# Patient Record
Sex: MALE | Race: WHITE | NOT HISPANIC OR LATINO | Employment: OTHER | URBAN - NONMETROPOLITAN AREA
[De-identification: names, ages, dates, MRNs, and addresses within clinical notes are randomized per-mention and may not be internally consistent; named-entity substitution may affect disease eponyms.]

---

## 2017-02-08 DIAGNOSIS — I48.0 PAF (PAROXYSMAL ATRIAL FIBRILLATION) (HCC): ICD-10-CM

## 2017-02-08 DIAGNOSIS — Z79.01 CHRONIC ANTICOAGULATION: ICD-10-CM

## 2017-04-11 ENCOUNTER — OFFICE VISIT (OUTPATIENT)
Dept: CARDIOLOGY | Facility: CLINIC | Age: 77
End: 2017-04-11
Payer: MEDICARE

## 2017-04-11 VITALS
OXYGEN SATURATION: 92 % | HEIGHT: 66 IN | BODY MASS INDEX: 35.68 KG/M2 | WEIGHT: 222 LBS | DIASTOLIC BLOOD PRESSURE: 80 MMHG | HEART RATE: 61 BPM | SYSTOLIC BLOOD PRESSURE: 160 MMHG

## 2017-04-11 DIAGNOSIS — E66.9 OBESITY (BMI 35.0-39.9 WITHOUT COMORBIDITY): ICD-10-CM

## 2017-04-11 DIAGNOSIS — Z79.01 CHRONIC ANTICOAGULATION: ICD-10-CM

## 2017-04-11 DIAGNOSIS — G47.33 OSA (OBSTRUCTIVE SLEEP APNEA): ICD-10-CM

## 2017-04-11 DIAGNOSIS — I48.0 PAF (PAROXYSMAL ATRIAL FIBRILLATION) (HCC): ICD-10-CM

## 2017-04-11 DIAGNOSIS — E78.1 HYPERTRIGLYCERIDEMIA: ICD-10-CM

## 2017-04-11 DIAGNOSIS — Z78.9 STATIN INTOLERANCE: ICD-10-CM

## 2017-04-11 PROCEDURE — 1036F TOBACCO NON-USER: CPT | Performed by: INTERNAL MEDICINE

## 2017-04-11 PROCEDURE — 3288F FALL RISK ASSESSMENT DOCD: CPT | Mod: 8P | Performed by: INTERNAL MEDICINE

## 2017-04-11 PROCEDURE — G8432 DEP SCR NOT DOC, RNG: HCPCS | Performed by: INTERNAL MEDICINE

## 2017-04-11 PROCEDURE — 99214 OFFICE O/P EST MOD 30 MIN: CPT | Performed by: INTERNAL MEDICINE

## 2017-04-11 PROCEDURE — G8417 CALC BMI ABV UP PARAM F/U: HCPCS | Performed by: INTERNAL MEDICINE

## 2017-04-11 PROCEDURE — 4040F PNEUMOC VAC/ADMIN/RCVD: CPT | Performed by: INTERNAL MEDICINE

## 2017-04-11 PROCEDURE — 3017F COLORECTAL CA SCREEN DOC REV: CPT | Mod: 8P | Performed by: INTERNAL MEDICINE

## 2017-04-11 PROCEDURE — 1100F PTFALLS ASSESS-DOCD GE2>/YR: CPT | Performed by: INTERNAL MEDICINE

## 2017-04-11 PROCEDURE — 0518F FALL PLAN OF CARE DOCD: CPT | Mod: 8P | Performed by: INTERNAL MEDICINE

## 2017-04-11 NOTE — MR AVS SNAPSHOT
"        Alexis Lucasl   2017 2:40 PM   Office Visit   MRN: [id number]    Department:  Heart [facility]   Dept Phone:  [phone]    Description:  Male : 1940   Provider:  Karen Schwab MD,[facility]           Reason for Visit     Follow-Up           Allergies as of 2017     Allergen Noted Reactions    Remicade [Infliximab Injection] 2013   Anaphylaxis    Pt allergy to Remicade, takes pre treatment of Benadryl and SoluMedrol and is able to tolerate infusion.      You were diagnosed with     PAF (paroxysmal atrial fibrillation) (CMS-HCC)   [638087]       Chronic anticoagulation   [889574]       Statin intolerance   [031309]       Hypertriglyceridemia   [314674]       Obesity (BMI 35.0-39.9 without comorbidity) ([facility])   [732383]       MARILEE (obstructive sleep apnea)   [652561]         Vital Signs     Blood Pressure Pulse Height Weight Body Mass Index Oxygen Saturation    160/80 mmHg 61 1.676 m (5' 6\") 100.699 kg (222 lb) 35.85 kg/m2 92%    Smoking Status                   Former Smoker           Basic Information     Date Of Birth Sex Race Ethnicity Preferred Language    1940 Male White Non- English      Your appointments     May 01, 2017  2:00 PM   IV BIOLOGIC 1HR with [facility]F CHAIR 3   [facility] (--)    Outpatient Infusion Center  [address] [place] [id number] [phone]            2017  2:40 PM   FOLLOW UP with Karen Schwab MD,[facility] ([facility]    [address] [place] [id number] [phone]              Problem List              ICD-10-CM Priority Class Noted - Resolved    Palpitations R00.2   2013 - Present    Rheumatoid arteritis I00   2013 - Present    S/P knee surgery Z98.890   2013 - Present    History of back surgery Z98.890   2013 - Present    H/O eye surgery Z98.890   2013 - Present    H/O hand surgery Z98.890   " 9/20/2013 - Present    Hypertriglyceridemia E78.1   8/24/2015 - Present    Elevated fasting glucose R73.01   12/3/2015 - Present      Health Maintenance        Date Due Completion Dates    DIABETES MONOFILAMENT / LE EXAM 3/29/1941 ---    URINE ACR / MICROALBUMIN 9/29/1960 ---    IMM DTaP/Tdap/Td Vaccine (1 - Tdap) 9/29/1961 ---    RETINAL SCREENING 2/23/2017 2/23/2016 (Done)    Override on 2/23/2016: Done (Dr. Marroquin)    A1C SCREENING 4/10/2017 10/10/2016, 2/29/2016, 12/3/2015, 5/28/2015    FASTING LIPID PROFILE 10/10/2017 10/10/2016, 3/9/2016, 12/23/2015, 6/12/2015, 8/23/2013    IMM PNEUMOCOCCAL 65+ (ADULT) LOW/MEDIUM RISK SERIES (2 of 2 - PPSV23) 12/1/2017 12/1/2016    SERUM CREATININE 3/2/2018 3/2/2017, 10/10/2016, 7/7/2016, 5/11/2016, 1/20/2016, 9/23/2015, 5/20/2015, 1/21/2015, 10/1/2014, 6/11/2014, 1/23/2014, 9/25/2013, 5/22/2013    COLONOSCOPY 5/28/2025 5/28/2015 (Postponed)    Override on 5/28/2015: Postponed            Current Immunizations     13-VALENT PCV PREVNAR 12/1/2016    Influenza TIV (IM) 11/30/2015, 11/2/2014, 12/31/2013    Influenza Vaccine Adult HD 12/1/2016    SHINGLES VACCINE 11/2/2014      Below and/or attached are the medications your provider expects you to take. Review all of your home medications and newly ordered medications with your provider and/or pharmacist. Follow medication instructions as directed by your provider and/or pharmacist. Please keep your medication list with you and share with your provider. Update the information when medications are discontinued, doses are changed, or new medications (including over-the-counter products) are added; and carry medication information at all times in the event of emergency situations     Allergies:  REMICADE - Anaphylaxis               Medications  Valid as of: April 11, 2017 -  3:11 PM    Generic Name Brand Name Tablet Size Instructions for use    Abatacept   by Intravenous route.        Albuterol Sulfate (Aero Soln) albuterol 108 (90 BASE)  MCG/ACT Inhale 2 Puffs by mouth every 6 hours as needed for Shortness of Breath.        Albuterol Sulfate (Aero Soln) albuterol 108 (90 BASE) MCG/ACT Inhale 2 Puffs by mouth every 6 hours as needed for Shortness of Breath.        Atorvastatin Calcium (Tab) LIPITOR 20 MG TAKE ONE TABLET BY MOUTH ONCE DAILY        Budesonide-Formoterol Fumarate (Aerosol) SYMBICORT 160-4.5 MCG/ACT         Celecoxib (Cap) CELEBREX 200 MG         DiazePAM (Tab) VALIUM 5 MG TAKE ONE TABLET BY MOUTH EVERY 6 HOURS AS NEEDED FOR ANXIETY        EPINEPHrine (Device) EPINEPHrine 0.3 MG/0.3ML 1 PEN by Injection route 1 time daily as needed.        HydroCHLOROthiazide (Tab) HYDRODIURIL 25 MG Take 1 Tab by mouth every day.        Hydrocodone-Acetaminophen (Tab) NORCO 5-325 MG         Lisinopril (Tab) PRINIVIL 20 MG TAKE ONE TABLET BY MOUTH ONCE DAILY        Meloxicam (Tab) MOBIC 15 MG         MetFORMIN HCl (Tab) GLUCOPHAGE 500 MG Take 1 Tab by mouth 2 times a day, with meals.        MethylPREDNISolone (Tab) MEDROL 2 MG Take 2 mg by mouth every day.          Metoprolol Tartrate (Tab) LOPRESSOR 25 MG TAKE ONE TABLET BY MOUTH TWICE DAILY        Multiple Vitamins-Minerals   Take  by mouth.        Multiple Vitamins-Minerals   Take  by mouth.        Nutritional Supplements   Take  by mouth.        Omega-3-acid Ethyl Esters (Cap) LOVAZA 1 GM Take 2 Caps by mouth 2 times a day.        Omeprazole (CAPSULE DELAYED RELEASE) PRILOSEC 20 MG         PARoxetine HCl (Tab) PAXIL 20 MG TAKE ONE TABLET BY MOUTH ONCE DAILY        Rivaroxaban (Tab) XARELTO 20 MG Take 1 Tab by mouth with dinner.        Sulfamethoxazole-Trimethoprim (Tab) BACTRIM -160 MG 4ake 1 tablet twice daily for 14 days        TraMADol HCl (Tab) ULTRAM 50 MG Take 50 mg by mouth as needed.          Triamcinolone Acetonide (Inhaler) NASACORT 55 MCG/ACT Crane 2 Sprays in nose every day.          Triamcinolone Acetonide (Aerosol) NASACORT 55 MCG/ACT Crane 2 Sprays in nose every day.         Triamcinolone Acetonide (Cream) KENALOG 0.1 % Apply to affected area(s) 2 times a day.        .                 Medicines prescribed today were sent to:     [facility] [address] [place] [address] [address] [facility]    [address] [facility] [address]    Phone: [phone] Fax: [phone]    Open 24 Hours?: No      Medication refill instructions:       If your prescription bottle indicates you have medication refills left, it is not necessary to call your provider’s office. Please contact your pharmacy and they will refill your medication.    If your prescription bottle indicates you do not have any refills left, you may request refills at any time through one of the following ways: The online [facility] system (except Urgent Care), by calling your provider’s office, or by asking your pharmacy to contact your provider’s office with a refill request. Medication refills are processed only during regular business hours and may not be available until the next business day. Your provider may request additional information or to have a follow-up visit with you prior to refilling your medication.   *Please Note: Medication refills are assigned a new Rx number when refilled electronically. Your pharmacy may indicate that no refills were authorized even though a new prescription for the same medication is available at the pharmacy. Please request the medicine by name with the pharmacy before contacting your provider for a refill.           [facility] Access Code: Activation code not generated  Current [facility] Status: Active

## 2017-04-11 NOTE — PROGRESS NOTES
Chief Complaint   Patient presents with   • Follow-Up       This patient is an established male who is here today to discuss:  Recheck high TG and HTN;    Patient Active Problem List    Diagnosis Date Noted   • Elevated fasting glucose 12/03/2015   • Hypertriglyceridemia 08/24/2015   • Palpitations 09/20/2013   • Rheumatoid arteritis 09/20/2013   • S/P knee surgery 09/20/2013   • History of back surgery 09/20/2013   • H/O eye surgery 09/20/2013   • H/O hand surgery 09/20/2013       Past Medical History   Diagnosis Date   • Arthritis      rheumatoid   • Depression    • Macular degeneration    • Hypertension    • Diabetes (CMS-HCC)      Past Surgical History   Procedure Laterality Date   • Laminotomy  1984     double discectomy lumbar   • Eye surgery       bilateral cataracts   • Athroplasty       bilateral knees       Current Outpatient Prescriptions   Medication Sig Dispense Refill   • lisinopril (PRINIVIL) 20 MG Tab TAKE ONE TABLET BY MOUTH ONCE DAILY 90 Tab 0   • metoprolol (LOPRESSOR) 25 MG Tab TAKE ONE TABLET BY MOUTH TWICE DAILY 180 Tab 2   • diazepam (VALIUM) 5 MG Tab TAKE ONE TABLET BY MOUTH EVERY 6 HOURS AS NEEDED FOR ANXIETY 30 Tab 0   • rivaroxaban (XARELTO) 20 MG Tab tablet Take 1 Tab by mouth with dinner. 90 Tab 3   • atorvastatin (LIPITOR) 20 MG Tab TAKE ONE TABLET BY MOUTH ONCE DAILY 30 Tab 3   • metformin (GLUCOPHAGE) 500 MG Tab Take 1 Tab by mouth 2 times a day, with meals. 180 Tab 2   • paroxetine (PAXIL) 20 MG Tab TAKE ONE TABLET BY MOUTH ONCE DAILY 90 Tab 1   • Multiple Vitamins-Minerals (VISION-WILFREDO PRESERVE PO) Take  by mouth.     • Multiple Vitamins-Minerals (MULTIVITAL PO) Take  by mouth.     • omega-3 acid ethyl esters (LOVAZA) 1 GM capsule Take 2 Caps by mouth 2 times a day. 120 Cap 3   • hydrochlorothiazide (HYDRODIURIL) 25 MG Tab Take 1 Tab by mouth every day. 90 Tab 3   • SYMBICORT 160-4.5 MCG/ACT Aerosol   0   • omeprazole (PRILOSEC) 20 MG delayed-release capsule      • Nutritional  Supplements (JUICE PLUS FIBRE PO) Take  by mouth.     • methylPREDNISolone (MEDROL) 2 MG TABS Take 2 mg by mouth every day.       • tramadol (ULTRAM) 50 MG TABS Take 50 mg by mouth as needed.       • triamcinolone (NASACORT AQ) 55 MCG/ACT nasal inhaler Spray 2 Sprays in nose every day.       • sulfamethoxazole-trimethoprim (BACTRIM DS) 800-160 MG tablet 4ake 1 tablet twice daily for 14 days 28 Tab 0   • albuterol 108 (90 BASE) MCG/ACT Aero Soln inhalation aerosol Inhale 2 Puffs by mouth every 6 hours as needed for Shortness of Breath. 1 Inhaler 0   • triamcinolone acetonide (KENALOG) 0.1 % Cream Apply to affected area(s) 2 times a day. 1 Tube 1   • triamcinolone (NASACORT AQ) 55 MCG/ACT nasal inhaler Spray 2 Sprays in nose every day.     • Abatacept (ORENCIA IV) by Intravenous route.     • celecoxib (CELEBREX) 200 MG Cap      • meloxicam (MOBIC) 15 MG tablet      • hydrocodone-acetaminophen (NORCO) 5-325 MG Tab per tablet      • albuterol (VENTOLIN OR PROVENTIL) 108 (90 BASE) MCG/ACT AERS Inhale 2 Puffs by mouth every 6 hours as needed for Shortness of Breath. 8.5 g 3   • EPINEPHrine (EPIPEN) 0.3 MG/0.3ML SILVINO 1 PEN by Injection route 1 time daily as needed. 1 Device 0     No current facility-administered medications for this visit.     Remicade      Review of Systems:     Constitutional: Denies fevers, Denies weight changes  Eyes: Denies changes in vision, no eye pain  Ears/Nose/Throat/Mouth: Denies nasal congestion or sore throat   Cardiovascular: Denies chest pain or palpitations   Respiratory: LUGO; shortness of breath , Denies cough  Gastrointestinal/Hepatic: Denies abdominal pain, nausea, vomiting, diarrhea, constipation or GI bleeding   Genitourinary: Denies bladder dysfunction, dysuria or frequency  Musculoskeletal/Rheum: Denies  joint pain and swelling   Skin/Breast: Denies rash, denies breast lumps or discharge  Neurological: Denies headache, confusion, memory loss or focal  "weakness/parasthesias  Psychiatric: denies mood disorder   Endocrine: denies hx of diabetes or thyroid dysfunction  Heme/Oncology/Lymph Nodes: Denies enlarged lymph nodes, denies brusing or known bleeding disorder  Allergic/Immunologic:  hx of allergies      All other systems were reviewed and are negative (AMA/CMS criteria)      Blood pressure 160/80, pulse 61, height 1.676 m (5' 6\"), weight 100.699 kg (222 lb), SpO2 92 %.  General Appearance: Obese; Well developed, Well nourished, No acute distress, Non-toxic appearance.    HENT: Normocephalic, Atraumatic, Oropharynx moist mucous membranes, Dentition: Mallampati 4 OP, Nose normal.    Eyes: PERRLA, EOMI, Conjunctiva normal, No discharge.  Neck: Normal range of motion, No cervical tenderness, Supple, No stridor, no JVD . No thyromegaly. No carotid bruit.  Cardiovascular: Normal heart rate, Normal rhythm, loud S1, S2, no S3, S4; No gallops; Flow murmurs, No rubs, . Extremitites with intact distal pulses, no cyanosis, clubbing or edema. No heaves, thrills, HJR;  Peripheral pulses: carotid 2+, brachial 2+, radial 2+, ulnar 2+, femoral 2+, popliteal 1/2+, PT 2+, DP 2+;  Lungs: Respiratory effort is normal. Normal breath sounds, breath sounds crackles to auscultation bilaterally, no rales, no rhonchi, no wheezing.    Abdomen: Bowel sounds normal, Soft, No tenderness, No guarding, No rebound, No masses, No hepatosplenomegaly.  Skin: Warm, Dry, No erythema, No rash, no induration or crepitus.  Neurologic: Alert & oriented x 3, Normal motor function, Normal sensory function, No focal deficits noted, cranial nerves II through XII are normal, normal gait.  Psychiatric: Affect normal, Judgment normal, Mood normal  Results for YARITZA REAVES (MRN [id number]) as of 4/11/2017 15:03   Ref. Range 10/10/2016 11:14 10/10/2016 12:23 10/27/2016 09:27 10/27/2016 13:51 3/2/2017 14:15   Sodium Latest Ref Range: 136-145 mmol/L 139    143   Potassium Latest Ref Range: 3.5-5.1 mmol/L 4.6    " 4.5   Chloride Latest Ref Range:  mmol/L 103    107   Co2 Latest Ref Range: 21-32 mmol/L 27    24   Anion Gap Latest Ref Range: 10-18 mmol/L 14    17   Glucose Latest Ref Range: 74-99 mg/dL 134 (H)    157 (H)   Bun Latest Ref Range: 7-18 mg/dL 25 (H)    17   Creatinine Latest Ref Range: 0.8-1.3 mg/dL 1.2    1.2   GFR If  Latest Ref Range: >60 mL/min/1.73 m 2 >60    >60   GFR If Non  Latest Ref Range: >60 mL/min/1.73 m 2 59 (A)    59 (A)   Calcium Latest Ref Range: 8.5-11.0 mg/dL 9.2    9.2   AST(SGOT) Latest Ref Range: 15-37 U/L 20    31   ALT(SGPT) Latest Ref Range: 12-78 U/L 23    29   Alkaline Phosphatase Latest Ref Range:  U/L 41 (L)    48   Total Bilirubin Latest Ref Range: 0.2-1.0 mg/dL 0.3    0.4   Albumin Latest Ref Range: 3.4-5.0 g/dL 3.9    3.8   Total Protein Latest Ref Range: 6.4-8.2 g/dL 7.4    7.2   A-G Ratio Unknown 1.1    1.1   Glycohemoglobin Latest Ref Range: <6.0 %  7.2 (H)      Estim. Avg Glu Latest Units: mg/dL  160      Cholesterol,Tot Latest Ref Range: 120-200 mg/dL 217 (H)       Triglycerides Latest Ref Range: 0-150 mg/dL 410 (H)       HDL Latest Ref Range: 40.0-60.0 mg/dL 33.0 (L)       Non HDL Cholesterol Latest Ref Range:   184 (H)       LDL Latest Ref Range: <100 mg/dL see below       Chol-Hdl Ratio Unknown 6.58       LDL Direct Latest Ref Range: <130 mg/dL 92           Assessment and Plan.   76 y.o. male has less palpitation with less coffee; TG elevation discussed and will try to have fat free diet, which means stopping his nightly ice cream; He is already on Lovaza    1. PAF (paroxysmal atrial fibrillation) (CMS-HCC)  stable    2. Chronic anticoagulation  No bleeding; Xarelto    3. Statin intolerance  Discussed risks and educated about the subject related matters      4. Hypertriglyceridemia  Discussed risks and educated about the subject related matters    - LIPID PANEL    5. Obesity (BMI 35.0-39.9 without comorbidity)  (HCC)  stable    6. MARILEE (obstructive sleep apnea)  CPAP      1. PAF (paroxysmal atrial fibrillation) (CMS-HCC)     2. Chronic anticoagulation     3. Statin intolerance     4. Hypertriglyceridemia  LIPID PANEL   5. Obesity (BMI 35.0-39.9 without comorbidity) (HCC)     6. MARILEE (obstructive sleep apnea)

## 2017-04-11 NOTE — Clinical Note
[facility] [address],   2[address]  [place] [address]  Phone: [phone]  Fax: [phone]              Alexis Randall  1940    Encounter Date: 4/11/2017    Sujatha Go PA-C    Thank you for the referral. I had the pleasure of seeing Alexis Randall today in cardiology clinic. I've attached my visit note below. If you have any questions please feel free to give me a call anytime.      Karen Schwab MD, PhD, [facility]  Cardiology and Lipidology  [facility]                                                                  PROGRESS NOTE:  Chief Complaint   Patient presents with   • Follow-Up       This patient is an established male who is here today to discuss:  Recheck high TG and HTN;    Patient Active Problem List    Diagnosis Date Noted   • Elevated fasting glucose 12/03/2015   • Hypertriglyceridemia 08/24/2015   • Palpitations 09/20/2013   • Rheumatoid arteritis 09/20/2013   • S/P knee surgery 09/20/2013   • History of back surgery 09/20/2013   • H/O eye surgery 09/20/2013   • H/O hand surgery 09/20/2013       Past Medical History   Diagnosis Date   • Arthritis      rheumatoid   • Depression    • Macular degeneration    • Hypertension    • Diabetes (CMS-HCC)      Past Surgical History   Procedure Laterality Date   • Laminotomy  1984     double discectomy lumbar   • Eye surgery       bilateral cataracts   • Athroplasty       bilateral knees       Current Outpatient Prescriptions   Medication Sig Dispense Refill   • lisinopril (PRINIVIL) 20 MG Tab TAKE ONE TABLET BY MOUTH ONCE DAILY 90 Tab 0   • metoprolol (LOPRESSOR) 25 MG Tab TAKE ONE TABLET BY MOUTH TWICE DAILY 180 Tab 2   • diazepam (VALIUM) 5 MG Tab TAKE ONE TABLET BY MOUTH EVERY 6 HOURS AS NEEDED FOR ANXIETY 30 Tab 0   • rivaroxaban (XARELTO) 20 MG Tab tablet Take 1 Tab by mouth with dinner. 90 Tab 3   • atorvastatin (LIPITOR) 20 MG Tab  TAKE ONE TABLET BY MOUTH ONCE DAILY 30 Tab 3   • metformin (GLUCOPHAGE) 500 MG Tab Take 1 Tab by mouth 2 times a day, with meals. 180 Tab 2   • paroxetine (PAXIL) 20 MG Tab TAKE ONE TABLET BY MOUTH ONCE DAILY 90 Tab 1   • Multiple Vitamins-Minerals (VISION-WILFREDO PRESERVE PO) Take  by mouth.     • Multiple Vitamins-Minerals (MULTIVITAL PO) Take  by mouth.     • omega-3 acid ethyl esters (LOVAZA) 1 GM capsule Take 2 Caps by mouth 2 times a day. 120 Cap 3   • hydrochlorothiazide (HYDRODIURIL) 25 MG Tab Take 1 Tab by mouth every day. 90 Tab 3   • SYMBICORT 160-4.5 MCG/ACT Aerosol   0   • omeprazole (PRILOSEC) 20 MG delayed-release capsule      • Nutritional Supplements (JUICE PLUS FIBRE PO) Take  by mouth.     • methylPREDNISolone (MEDROL) 2 MG TABS Take 2 mg by mouth every day.       • tramadol (ULTRAM) 50 MG TABS Take 50 mg by mouth as needed.       • triamcinolone (NASACORT AQ) 55 MCG/ACT nasal inhaler Spray 2 Sprays in nose every day.       • sulfamethoxazole-trimethoprim (BACTRIM DS) 800-160 MG tablet 4ake 1 tablet twice daily for 14 days 28 Tab 0   • albuterol 108 (90 BASE) MCG/ACT Aero Soln inhalation aerosol Inhale 2 Puffs by mouth every 6 hours as needed for Shortness of Breath. 1 Inhaler 0   • triamcinolone acetonide (KENALOG) 0.1 % Cream Apply to affected area(s) 2 times a day. 1 Tube 1   • triamcinolone (NASACORT AQ) 55 MCG/ACT nasal inhaler Spray 2 Sprays in nose every day.     • Abatacept (ORENCIA IV) by Intravenous route.     • celecoxib (CELEBREX) 200 MG Cap      • meloxicam (MOBIC) 15 MG tablet      • hydrocodone-acetaminophen (NORCO) 5-325 MG Tab per tablet      • albuterol (VENTOLIN OR PROVENTIL) 108 (90 BASE) MCG/ACT AERS Inhale 2 Puffs by mouth every 6 hours as needed for Shortness of Breath. 8.5 g 3   • EPINEPHrine (EPIPEN) 0.3 MG/0.3ML SILVINO 1 PEN by Injection route 1 time daily as needed. 1 Device 0     No current facility-administered medications for this visit.     Remicade      Review of  "Systems:     Constitutional: Denies fevers, Denies weight changes  Eyes: Denies changes in vision, no eye pain  Ears/Nose/Throat/Mouth: Denies nasal congestion or sore throat   Cardiovascular: Denies chest pain or palpitations   Respiratory: LUGO; shortness of breath , Denies cough  Gastrointestinal/Hepatic: Denies abdominal pain, nausea, vomiting, diarrhea, constipation or GI bleeding   Genitourinary: Denies bladder dysfunction, dysuria or frequency  Musculoskeletal/Rheum: Denies  joint pain and swelling   Skin/Breast: Denies rash, denies breast lumps or discharge  Neurological: Denies headache, confusion, memory loss or focal weakness/parasthesias  Psychiatric: denies mood disorder   Endocrine: denies hx of diabetes or thyroid dysfunction  Heme/Oncology/Lymph Nodes: Denies enlarged lymph nodes, denies brusing or known bleeding disorder  Allergic/Immunologic:  hx of allergies      All other systems were reviewed and are negative (AMA/CMS criteria)      Blood pressure 160/80, pulse 61, height 1.676 m (5' 6\"), weight 100.699 kg (222 lb), SpO2 92 %.  General Appearance: Obese; Well developed, Well nourished, No acute distress, Non-toxic appearance.    HENT: Normocephalic, Atraumatic, Oropharynx moist mucous membranes, Dentition: Mallampati 4 OP, Nose normal.    Eyes: PERRLA, EOMI, Conjunctiva normal, No discharge.  Neck: Normal range of motion, No cervical tenderness, Supple, No stridor, no JVD . No thyromegaly. No carotid bruit.  Cardiovascular: Normal heart rate, Normal rhythm, loud S1, S2, no S3, S4; No gallops; Flow murmurs, No rubs, . Extremitites with intact distal pulses, no cyanosis, clubbing or edema. No heaves, thrills, HJR;  Peripheral pulses: carotid 2+, brachial 2+, radial 2+, ulnar 2+, femoral 2+, popliteal 1/2+, PT 2+, DP 2+;  Lungs: Respiratory effort is normal. Normal breath sounds, breath sounds crackles to auscultation bilaterally, no rales, no rhonchi, no wheezing.    Abdomen: Bowel sounds normal, " Soft, No tenderness, No guarding, No rebound, No masses, No hepatosplenomegaly.  Skin: Warm, Dry, No erythema, No rash, no induration or crepitus.  Neurologic: Alert & oriented x 3, Normal motor function, Normal sensory function, No focal deficits noted, cranial nerves II through XII are normal, normal gait.  Psychiatric: Affect normal, Judgment normal, Mood normal  Results for YARITZA REAVES (MRN [id number]) as of 4/11/2017 15:03   Ref. Range 10/10/2016 11:14 10/10/2016 12:23 10/27/2016 09:27 10/27/2016 13:51 3/2/2017 14:15   Sodium Latest Ref Range: 136-145 mmol/L 139    143   Potassium Latest Ref Range: 3.5-5.1 mmol/L 4.6    4.5   Chloride Latest Ref Range:  mmol/L 103    107   Co2 Latest Ref Range: 21-32 mmol/L 27    24   Anion Gap Latest Ref Range: 10-18 mmol/L 14    17   Glucose Latest Ref Range: 74-99 mg/dL 134 (H)    157 (H)   Bun Latest Ref Range: 7-18 mg/dL 25 (H)    17   Creatinine Latest Ref Range: 0.8-1.3 mg/dL 1.2    1.2   GFR If  Latest Ref Range: >60 mL/min/1.73 m 2 >60    >60   GFR If Non  Latest Ref Range: >60 mL/min/1.73 m 2 59 (A)    59 (A)   Calcium Latest Ref Range: 8.5-11.0 mg/dL 9.2    9.2   AST(SGOT) Latest Ref Range: 15-37 U/L 20    31   ALT(SGPT) Latest Ref Range: 12-78 U/L 23    29   Alkaline Phosphatase Latest Ref Range:  U/L 41 (L)    48   Total Bilirubin Latest Ref Range: 0.2-1.0 mg/dL 0.3    0.4   Albumin Latest Ref Range: 3.4-5.0 g/dL 3.9    3.8   Total Protein Latest Ref Range: 6.4-8.2 g/dL 7.4    7.2   A-G Ratio Unknown 1.1    1.1   Glycohemoglobin Latest Ref Range: <6.0 %  7.2 (H)      Estim. Avg Glu Latest Units: mg/dL  160      Cholesterol,Tot Latest Ref Range: 120-200 mg/dL 217 (H)       Triglycerides Latest Ref Range: 0-150 mg/dL 410 (H)       HDL Latest Ref Range: 40.0-60.0 mg/dL 33.0 (L)       Non HDL Cholesterol Latest Ref Range:   184 (H)       LDL Latest Ref Range: <100 mg/dL see below       Chol-Hdl Ratio Unknown 6.58         LDL Direct Latest Ref Range: <130 mg/dL 92           Assessment and Plan.   76 y.o. male has less palpitation with less coffee; TG elevation discussed and will try to have fat free diet, which means stopping his nightly ice cream; He is already on Lovaza    1. PAF (paroxysmal atrial fibrillation) (CMS-[facility])  stable    2. Chronic anticoagulation  No bleeding; Xarelto    3. Statin intolerance  Discussed risks and educated about the subject related matters      4. Hypertriglyceridemia  Discussed risks and educated about the subject related matters    - LIPID PANEL    5. Obesity (BMI 35.0-39.9 without comorbidity) ([facility])  stable    6. MARILEE (obstructive sleep apnea)  CPAP      1. PAF (paroxysmal atrial fibrillation) (CMS-[facility])     2. Chronic anticoagulation     3. Statin intolerance     4. Hypertriglyceridemia  LIPID PANEL   5. Obesity (BMI 35.0-39.9 without comorbidity) ([facility])     6. MARILEE (obstructive sleep apnea)               Sujatha Go PA-C  [address]  [place] [address]  VIA In Basket

## 2017-04-17 ENCOUNTER — TELEPHONE (OUTPATIENT)
Dept: CARDIOLOGY | Facility: MEDICAL CENTER | Age: 77
End: 2017-04-17

## 2017-04-17 NOTE — TELEPHONE ENCOUNTER
----- Message from Marie Cuevas sent at 4/17/2017 11:27 AM PDT -----  Regarding: Patient calling for lab results  ALDO/Freddy    Patient wants a call back to get his lab results and can be reached at [phone].

## 2017-04-17 NOTE — TELEPHONE ENCOUNTER
Discussed lab results with patient who was very excited that his triglycerides went from 410 to 188 in the last 6 months.  He will call us if he needs anything and wanted me to thank the physician

## 2017-05-12 DIAGNOSIS — I10 ESSENTIAL HYPERTENSION: ICD-10-CM

## 2017-05-12 RX ORDER — HYDROCHLOROTHIAZIDE 25 MG/1
TABLET ORAL
Qty: 90 TAB | Refills: 3 | Status: SHIPPED | OUTPATIENT
Start: 2017-05-12 | End: 2018-05-14 | Stop reason: SDUPTHER

## 2017-06-05 DIAGNOSIS — I48.0 PAF (PAROXYSMAL ATRIAL FIBRILLATION) (HCC): ICD-10-CM

## 2017-06-05 DIAGNOSIS — Z79.01 CHRONIC ANTICOAGULATION: ICD-10-CM

## 2017-06-06 ENCOUNTER — TELEPHONE (OUTPATIENT)
Dept: CARDIOLOGY | Facility: MEDICAL CENTER | Age: 77
End: 2017-06-06

## 2017-08-15 ENCOUNTER — OFFICE VISIT (OUTPATIENT)
Dept: CARDIOLOGY | Facility: CLINIC | Age: 77
End: 2017-08-15
Payer: MEDICARE

## 2017-08-15 VITALS
DIASTOLIC BLOOD PRESSURE: 86 MMHG | BODY MASS INDEX: 35.36 KG/M2 | OXYGEN SATURATION: 93 % | HEIGHT: 66 IN | HEART RATE: 56 BPM | WEIGHT: 220 LBS | SYSTOLIC BLOOD PRESSURE: 150 MMHG

## 2017-08-15 DIAGNOSIS — Z79.01 CHRONIC ANTICOAGULATION: ICD-10-CM

## 2017-08-15 DIAGNOSIS — Z78.9 STATIN INTOLERANCE: ICD-10-CM

## 2017-08-15 DIAGNOSIS — E66.9 OBESITY (BMI 35.0-39.9 WITHOUT COMORBIDITY): ICD-10-CM

## 2017-08-15 DIAGNOSIS — I48.0 PAF (PAROXYSMAL ATRIAL FIBRILLATION) (HCC): ICD-10-CM

## 2017-08-15 DIAGNOSIS — G47.33 OSA (OBSTRUCTIVE SLEEP APNEA): ICD-10-CM

## 2017-08-15 DIAGNOSIS — I10 ESSENTIAL HYPERTENSION: ICD-10-CM

## 2017-08-15 DIAGNOSIS — E78.5 OTHER AND UNSPECIFIED HYPERLIPIDEMIA: ICD-10-CM

## 2017-08-15 DIAGNOSIS — M05.20 RHEUMATOID ARTERITIS (HCC): ICD-10-CM

## 2017-08-15 PROCEDURE — 99214 OFFICE O/P EST MOD 30 MIN: CPT | Performed by: INTERNAL MEDICINE

## 2017-08-15 NOTE — Clinical Note
[facility] [place] [address],   [address]nd Floor  [place] [address]  Phone: [phone]  Fax: [phone]              Alexis Randall  1940    Encounter Date: 8/15/2017    Sujatha Go PA-C    Thank you for the referral. I had the pleasure of seeing Alexis Randall today in cardiology clinic. I've attached my visit note below. If you have any questions please feel free to give me a call anytime.      Karen Schwab MD, PhD, [facility]  Cardiology and Lipidology  [facility]                                                                  PROGRESS NOTE:  Chief Complaint   Patient presents with   • Follow-Up     3 month f/u PAF         This patient is an established male who is here today to discuss:  Rate control strategy; HLD; No new compaints but anger with his wife    Patient Active Problem List    Diagnosis Date Noted   • Elevated fasting glucose 12/03/2015   • Hypertriglyceridemia 08/24/2015   • Palpitations 09/20/2013   • Rheumatoid arteritis 09/20/2013   • S/P knee surgery 09/20/2013   • History of back surgery 09/20/2013   • H/O eye surgery 09/20/2013   • H/O hand surgery 09/20/2013       Past Medical History   Diagnosis Date   • Arthritis      rheumatoid   • Depression    • Macular degeneration    • Hypertension    • Diabetes (CMS-[facility])      Past Surgical History   Procedure Laterality Date   • Laminotomy  1984     double discectomy lumbar   • Eye surgery       bilateral cataracts   • Athroplasty       bilateral knees     Social History     Social History   • Marital Status:      Spouse Name: N/A   • Number of Children: N/A   • Years of Education: N/A     Social History Main Topics   • Smoking status: Former Smoker -- 24 years     Types: Cigarettes     Quit date: 01/01/1984   • Smokeless tobacco: Former User   • Alcohol Use: Yes   • Drug Use: No   • Sexual Activity: Not Asked     Other Topics  Concern   • None     Social History Narrative    .    Former .     Family History   Problem Relation Age of Onset   • Heart Disease Mother        Current Outpatient Prescriptions   Medication Sig Dispense Refill   • lisinopril (PRINIVIL) 20 MG Tab TAKE ONE TABLET BY MOUTH ONCE DAILY 90 Tab 0   • rivaroxaban (XARELTO) 20 MG Tab tablet Take 1 Tab by mouth with dinner. 90 Tab 3   • paroxetine (PAXIL) 20 MG Tab TAKE ONE TABLET BY MOUTH ONCE DAILY 90 Tab 1   • atorvastatin (LIPITOR) 20 MG Tab TAKE ONE TABLET BY MOUTH ONCE DAILY 30 Tab 3   • hydrochlorothiazide (HYDRODIURIL) 25 MG Tab TAKE ONE TABLET BY MOUTH ONCE DAILY 90 Tab 3   • escitalopram (LEXAPRO) 20 MG tablet Take 1 Tab by mouth every day. 90 Tab 1   • metformin (GLUCOPHAGE) 500 MG Tab Take 1 Tab by mouth 2 times a day, with meals. 180 Tab 2   • Multiple Vitamins-Minerals (VISION-WILFREDO PRESERVE PO) Take  by mouth.     • Multiple Vitamins-Minerals (MULTIVITAL PO) Take  by mouth.     • triamcinolone (NASACORT AQ) 55 MCG/ACT nasal inhaler Spray 2 Sprays in nose every day.     • Abatacept (ORENCIA IV) by Intravenous route.     • omega-3 acid ethyl esters (LOVAZA) 1 GM capsule Take 2 Caps by mouth 2 times a day. 120 Cap 3   • SYMBICORT 160-4.5 MCG/ACT Aerosol   0   • omeprazole (PRILOSEC) 20 MG delayed-release capsule      • methylPREDNISolone (MEDROL) 2 MG TABS Take 2 mg by mouth every day.       • diazepam (VALIUM) 5 MG Tab TAKE ONE TABLET BY MOUTH EVERY 6 HOURS AS NEEDED FOR  ANXIETY 30 Tab 0   • albuterol 108 (90 BASE) MCG/ACT Aero Soln inhalation aerosol Inhale 2 Puffs by mouth every 6 hours as needed for Shortness of Breath. 1 Inhaler 0   • metoprolol (LOPRESSOR) 25 MG Tab TAKE ONE TABLET BY MOUTH TWICE DAILY 180 Tab 2   • triamcinolone acetonide (KENALOG) 0.1 % Cream Apply to affected area(s) 2 times a day. 1 Tube 1   • celecoxib (CELEBREX) 200 MG Cap      • meloxicam (MOBIC) 15 MG tablet      • hydrocodone-acetaminophen (NORCO) 5-325 MG Tab  "per tablet      • albuterol (VENTOLIN OR PROVENTIL) 108 (90 BASE) MCG/ACT AERS Inhale 2 Puffs by mouth every 6 hours as needed for Shortness of Breath. 8.5 g 3   • EPINEPHrine (EPIPEN) 0.3 MG/0.3ML SILVINO 1 PEN by Injection route 1 time daily as needed. 1 Device 0   • tramadol (ULTRAM) 50 MG TABS Take 50 mg by mouth as needed.         No current facility-administered medications for this visit.     Remicade    Review of Systems:     Constitutional: Denies fevers, Denies weight changes  Eyes: Denies changes in vision, no eye pain  Ears/Nose/Throat/Mouth: Denies nasal congestion or sore throat   Cardiovascular: Denies chest pain or palpitations   Respiratory: Denies shortness of breath , Denies cough  Gastrointestinal/Hepatic: Denies abdominal pain, nausea, vomiting, diarrhea, constipation or GI bleeding   Genitourinary: Denies bladder dysfunction, dysuria or frequency  Musculoskeletal/Rheum: Denies  joint pain and swelling   Skin/Breast: Denies rash, denies breast lumps or discharge  Neurological: Denies headache, confusion, memory loss or focal weakness/parasthesias  Psychiatric: denies mood disorder   Endocrine: denies hx of diabetes or thyroid dysfunction  Heme/Oncology/Lymph Nodes: Denies enlarged lymph nodes, denies brusing or known bleeding disorder  Allergic/Immunologic:  hx of allergies      All other systems were reviewed and are negative (AMA/CMS criteria)      Blood pressure 150/86, pulse 56, height 1.676 m (5' 5.98\"), weight 99.791 kg (220 lb), SpO2 93 %.  General Appearance: Obese; Well developed, Well nourished, No acute distress, Non-toxic appearance.    HENT: Normocephalic, Atraumatic, Oropharynx moist mucous membranes, Dentition: Mallampati 4 OP, Nose normal.    Eyes: PERRLA, EOMI, Conjunctiva normal, No discharge.  Neck: Normal range of motion, No cervical tenderness, Supple, No stridor, no JVD . No thyromegaly. No carotid bruit.  Cardiovascular: Normal heart rate, Normal rhythm, loud S1, S2, no S3, " S4; No gallops; Flow murmurs, No rubs, . Extremitites with intact distal pulses, no cyanosis, clubbing or edema. No heaves, thrills, HJR;  Peripheral pulses: carotid 2+, brachial 2+, radial 2+, ulnar 2+, femoral 2+, popliteal 1/2+, PT 2+, DP 2+;  Lungs: Respiratory effort is normal. Normal breath sounds, breath sounds crackles to auscultation bilaterally, no rales, no rhonchi, no wheezing.    Abdomen: Bowel sounds normal, Soft, No tenderness, No guarding, No rebound, No masses, No hepatosplenomegaly.  Skin: Warm, Dry, No erythema, No rash, no induration or crepitus.  Neurologic: Alert & oriented x 3, Normal motor function, Normal sensory function, No focal deficits noted, cranial nerves II through XII are normal, normal gait.  Psychiatric: Affect normal, Judgment normal, Mood normal    Results for YARITZA REAVES (MRN [id number]) as of 8/15/2017 14:21   Ref. Range 3/2/2017 14:15 4/13/2017 09:25 6/5/2017 14:50   WBC Latest Ref Range: 4.8-10.8 K/uL 10.2  10.8   RBC Latest Ref Range: 4.70-6.10 M/uL 3.97 (L)  4.03 (L)   Hemoglobin Latest Ref Range: 14.0-18.0 g/dL 12.5 (L)  12.7 (L)   Hematocrit Latest Ref Range: 42.0-52.0 % 38.4 (L)  37.5 (L)   MCV Latest Ref Range: 80.0-94.0 fL 96.7 (H)  93.1   MCH Latest Ref Range: 27.0-31.0 pg 31.5 (H)  31.5 (H)   MCHC Latest Ref Range: 33.0-37.0 g/dL 32.6 (L)  33.9   RDW Latest Ref Range: 11.5-14.5 % 13.8  13.8   Platelet Count Latest Ref Range: 130-400 K/uL 258  243   MPV Latest Ref Range: 7.4-10.4 fL 10.2  10.0   Neutrophils Automated Latest Ref Range: 39.0-70.0 % 67.5  71.2 (H)   Abs Neutrophils Automated Latest Ref Range: 1.8-7.7 K/uL 6.9  7.7   Lymphocytes Automated Latest Ref Range: 21.0-50.0 % 19.5 (L)  17.3 (L)   Abs Lymph Automated Latest Ref Range: 1.2-4.8 K/uL 2.0  1.9   Eosinophils Automated Latest Ref Range: 0.0-5.0 % 2.6  2.9   Basophils Automated Latest Ref Range: 0.0-3.0 % 0.3  0.3   Monocytes Automated Latest Ref Range: 2.0-9.0 % 9.8 (H)  7.9   Sed Rate Westergren  Latest Ref Range: 0-20 mm/hour 28 (H)     Eosinophil Count, Blood Latest Ref Range: 0.00-0.50 K/uL 0.26  0.31   Sodium Latest Ref Range: 136-145 mmol/L 143  139   Potassium Latest Ref Range: 3.5-5.1 mmol/L 4.5  3.9   Chloride Latest Ref Range:  mmol/L 107  103   Co2 Latest Ref Range: 21-32 mmol/L 24  25   Anion Gap Latest Ref Range: 10-18 mmol/L 17  15   Glucose Latest Ref Range: 74-99 mg/dL 157 (H)  143 (H)   Bun Latest Ref Range: 7-18 mg/dL 17  22 (H)   Creatinine Latest Ref Range: 0.8-1.3 mg/dL 1.2  1.3   GFR If  Latest Ref Range: >60 mL/min/1.73 m 2 >60  >60   GFR If Non  Latest Ref Range: >60 mL/min/1.73 m 2 59 (A)  54 (A)   Calcium Latest Ref Range: 8.5-11.0 mg/dL 9.2  8.8   AST(SGOT) Latest Ref Range: 15-37 U/L 31  18   ALT(SGPT) Latest Ref Range: 12-78 U/L 29  27   Alkaline Phosphatase Latest Ref Range:  U/L 48  49   Total Bilirubin Latest Ref Range: 0.2-1.0 mg/dL 0.4  0.3   Albumin Latest Ref Range: 3.4-5.0 g/dL 3.8  3.7   Total Protein Latest Ref Range: 6.4-8.2 g/dL 7.2  7.3   A-G Ratio Unknown 1.1  1.0   Cholesterol,Tot Latest Ref Range: 120-200 mg/dL  129    Triglycerides Latest Ref Range: 0-150 mg/dL  188 (H)    HDL Latest Ref Range: 40.0-60.0 mg/dL  34.0 (L)    Non HDL Cholesterol Latest Ref Range:    95    LDL Latest Ref Range: <100 mg/dL  57    Chol-Hdl Ratio Unknown  3.79        Assessment and Plan.   76 y.o. male has stable cardiac status in AFib with controlled rate; Will recheck FLP    1. PAF (paroxysmal atrial fibrillation) (CMS-HCC)  No sx's    2. Chronic anticoagulation  No bleeding    3. Essential hypertension  controlled    4. Statin intolerance  reviewed    5. Obesity (BMI 35.0-39.9 without comorbidity) (HCC)  stable    6. MARILEE (obstructive sleep apnea)  BiPAP    7. Rheumatoid arteritis  CV risk, discussed      Return to clinic in  6 , months    1. PAF (paroxysmal atrial fibrillation) (CMS-HCC)     2. Chronic anticoagulation     3. Essential  hypertension     4. Statin intolerance     5. Obesity (BMI 35.0-39.9 without comorbidity) (HCC)     6. MARILEE (obstructive sleep apnea)     7. Rheumatoid arteritis               Sujatha Go PA-C  [address]  [facility] [address]  VIA In Basket

## 2017-08-15 NOTE — MR AVS SNAPSHOT
"        Alexis Lucasl   8/15/2017 2:20 PM   Office Visit   MRN: [id number]    Department:  [facility]   Dept Phone:  [phone]    Description:  Male : 1940   Provider:  Karen Schwab MD,[facility]           Reason for Visit     Follow-Up 3 month f/u PAF      Allergies as of 8/15/2017     Allergen Noted Reactions    Remicade [Infliximab Injection] 2013   Anaphylaxis    Pt allergy to Remicade, takes pre treatment of Benadryl and SoluMedrol and is able to tolerate infusion.      You were diagnosed with     PAF (paroxysmal atrial fibrillation) (CMS-HCC)   [675362]       Chronic anticoagulation   [107590]       Essential hypertension   [6066632]       Statin intolerance   [214818]       Obesity (BMI 35.0-39.9 without comorbidity) ([facility])   [581645]       MARILEE (obstructive sleep apnea)   [467854]       Rheumatoid arteritis   [132400]       Other and unspecified hyperlipidemia   [272.4.ICD-9-CM]         Vital Signs     Blood Pressure Pulse Height Weight Body Mass Index Oxygen Saturation    150/86 mmHg 56 1.676 m (5' 5.98\") 99.791 kg (220 lb) 35.53 kg/m2 93%    Smoking Status                   Former Smoker           Basic Information     Date Of Birth Sex Race Ethnicity Preferred Language    1940 Male White Non- English      Your appointments     Aug 31, 2017  3:00 PM   IV BIOLOGIC 1HR with [facility] INF CHAIR [facility]   [facility]    Out[facility] Infusion Center  [address] [place] [id number] [phone]              Problem List              ICD-10-CM Priority Class Noted - Resolved    Palpitations R00.2   2013 - Present    Rheumatoid arteritis I00   2013 - Present    S/P knee surgery Z98.890   2013 - Present    History of back surgery Z98.890   2013 - Present    H/O eye surgery Z98.890   2013 - Present    H/O hand surgery Z98.890   2013 - Present    Hypertriglyceridemia E78.1   2015 - Present    Elevated fasting glucose " R73.01   12/3/2015 - Present      Health Maintenance        Date Due Completion Dates    DIABETES MONOFILAMENT / LE EXAM 3/29/1941 ---    URINE ACR / MICROALBUMIN 9/29/1960 ---    IMM DTaP/Tdap/Td Vaccine (1 - Tdap) 9/29/1961 ---    RETINAL SCREENING 2/23/2017 2/23/2016 (Done)    Override on 2/23/2016: Done (Dr. Marroquin)    A1C SCREENING 4/10/2017 10/10/2016, 2/29/2016, 12/3/2015, 5/28/2015    IMM INFLUENZA (1) 9/1/2017 12/1/2016, 11/30/2015, 11/2/2014, 12/31/2013    IMM PNEUMOCOCCAL 65+ (ADULT) LOW/MEDIUM RISK SERIES (2 of 2 - PPSV23) 12/1/2017 12/1/2016    FASTING LIPID PROFILE 4/13/2018 4/13/2017, 10/10/2016, 3/9/2016, 12/23/2015, 6/12/2015, 8/23/2013    SERUM CREATININE 6/5/2018 6/5/2017, 3/2/2017, 10/10/2016, 7/7/2016, 5/11/2016, 1/20/2016, 9/23/2015, 5/20/2015, 1/21/2015, 10/1/2014, 6/11/2014, 1/23/2014, 9/25/2013, 5/22/2013    COLONOSCOPY 5/28/2025 5/28/2015 (Postponed)    Override on 5/28/2015: Postponed            Current Immunizations     13-VALENT PCV PREVNAR 12/1/2016    Influenza TIV (IM) 11/30/2015, 11/2/2014, 12/31/2013    Influenza Vaccine Adult HD 12/1/2016    SHINGLES VACCINE 11/2/2014      Below and/or attached are the medications your provider expects you to take. Review all of your home medications and newly ordered medications with your provider and/or pharmacist. Follow medication instructions as directed by your provider and/or pharmacist. Please keep your medication list with you and share with your provider. Update the information when medications are discontinued, doses are changed, or new medications (including over-the-counter products) are added; and carry medication information at all times in the event of emergency situations     Allergies:  REMICADE - Anaphylaxis               Medications  Valid as of: August 15, 2017 -  2:36 PM    Generic Name Brand Name Tablet Size Instructions for use    Abatacept   by Intravenous route.        Albuterol Sulfate (Aero Soln) albuterol 108 (90 BASE)  MCG/ACT Inhale 2 Puffs by mouth every 6 hours as needed for Shortness of Breath.        Albuterol Sulfate (Aero Soln) albuterol 108 (90 BASE) MCG/ACT Inhale 2 Puffs by mouth every 6 hours as needed for Shortness of Breath.        Atorvastatin Calcium (Tab) LIPITOR 20 MG TAKE ONE TABLET BY MOUTH ONCE DAILY        Budesonide-Formoterol Fumarate (Aerosol) SYMBICORT 160-4.5 MCG/ACT         Celecoxib (Cap) CELEBREX 200 MG         DiazePAM (Tab) VALIUM 5 MG TAKE ONE TABLET BY MOUTH EVERY 6 HOURS AS NEEDED FOR  ANXIETY        EPINEPHrine (Device) EPINEPHrine 0.3 MG/0.3ML 1 PEN by Injection route 1 time daily as needed.        Escitalopram Oxalate (Tab) LEXAPRO 20 MG Take 1 Tab by mouth every day.        HydroCHLOROthiazide (Tab) HYDRODIURIL 25 MG TAKE ONE TABLET BY MOUTH ONCE DAILY        Hydrocodone-Acetaminophen (Tab) NORCO 5-325 MG         Lisinopril (Tab) PRINIVIL 20 MG TAKE ONE TABLET BY MOUTH ONCE DAILY        Meloxicam (Tab) MOBIC 15 MG         MetFORMIN HCl (Tab) GLUCOPHAGE 500 MG Take 1 Tab by mouth 2 times a day, with meals.        MethylPREDNISolone (Tab) MEDROL 2 MG Take 2 mg by mouth every day.          Metoprolol Tartrate (Tab) LOPRESSOR 25 MG TAKE ONE TABLET BY MOUTH TWICE DAILY        Multiple Vitamins-Minerals   Take  by mouth.        Multiple Vitamins-Minerals   Take  by mouth.        Omega-3-acid Ethyl Esters (Cap) LOVAZA 1 GM Take 2 Caps by mouth 2 times a day.        Omeprazole (CAPSULE DELAYED RELEASE) PRILOSEC 20 MG         PARoxetine HCl (Tab) PAXIL 20 MG TAKE ONE TABLET BY MOUTH ONCE DAILY        Rivaroxaban (Tab) XARELTO 20 MG Take 1 Tab by mouth with dinner.        TraMADol HCl (Tab) ULTRAM 50 MG Take 50 mg by mouth as needed.          Triamcinolone Acetonide (Aerosol) NASACORT 55 MCG/ACT [place] 2 Sprays in nose every day.        Triamcinolone Acetonide (Cream) KENALOG 0.1 % Apply to affected area(s) 2 times a day.        .                 Medicines prescribed today were sent to:     [facility] [address] [place] [address] [address] [facility]    [address] [facility] [address]    Phone: [phone] Fax: [phone]    Open 24 Hours?: No      Medication refill instructions:       If your prescription bottle indicates you have medication refills left, it is not necessary to call your provider’s office. Please contact your pharmacy and they will refill your medication.    If your prescription bottle indicates you do not have any refills left, you may request refills at any time through one of the following ways: The online [employer] system (except Urgent Care), by calling your provider’s office, or by asking your pharmacy to contact your provider’s office with a refill request. Medication refills are processed only during regular business hours and may not be available until the next business day. Your provider may request additional information or to have a follow-up visit with you prior to refilling your medication.   *Please Note: Medication refills are assigned a new Rx number when refilled electronically. Your pharmacy may indicate that no refills were authorized even though a new prescription for the same medication is available at the pharmacy. Please request the medicine by name with the pharmacy before contacting your provider for a refill.           [employer] Access Code: Activation code not generated  Current [employer] Status: Active

## 2017-08-15 NOTE — PROGRESS NOTES
Chief Complaint   Patient presents with   • Follow-Up     3 month f/u PAF         This patient is an established male who is here today to discuss:  Rate control strategy; HLD; No new compaints but anger with his wife    Patient Active Problem List    Diagnosis Date Noted   • Elevated fasting glucose 12/03/2015   • Hypertriglyceridemia 08/24/2015   • Palpitations 09/20/2013   • Rheumatoid arteritis 09/20/2013   • S/P knee surgery 09/20/2013   • History of back surgery 09/20/2013   • H/O eye surgery 09/20/2013   • H/O hand surgery 09/20/2013       Past Medical History   Diagnosis Date   • Arthritis      rheumatoid   • Depression    • Macular degeneration    • Hypertension    • Diabetes (CMS-[facility])      Past Surgical History   Procedure Laterality Date   • Laminotomy  1984     double discectomy lumbar   • Eye surgery       bilateral cataracts   • Athroplasty       bilateral knees     Social History     Social History   • Marital Status:      Spouse Name: N/A   • Number of Children: N/A   • Years of Education: N/A     Social History Main Topics   • Smoking status: Former Smoker -- 24 years     Types: Cigarettes     Quit date: 01/01/1984   • Smokeless tobacco: Former User   • Alcohol Use: Yes   • Drug Use: No   • Sexual Activity: Not Asked     Other Topics Concern   • None     Social History Narrative    .    Former .     Family History   Problem Relation Age of Onset   • Heart Disease Mother        Current Outpatient Prescriptions   Medication Sig Dispense Refill   • lisinopril (PRINIVIL) 20 MG Tab TAKE ONE TABLET BY MOUTH ONCE DAILY 90 Tab 0   • rivaroxaban (XARELTO) 20 MG Tab tablet Take 1 Tab by mouth with dinner. 90 Tab 3   • paroxetine (PAXIL) 20 MG Tab TAKE ONE TABLET BY MOUTH ONCE DAILY 90 Tab 1   • atorvastatin (LIPITOR) 20 MG Tab TAKE ONE TABLET BY MOUTH ONCE DAILY 30 Tab 3   • hydrochlorothiazide (HYDRODIURIL) 25 MG Tab TAKE ONE TABLET BY MOUTH ONCE DAILY 90 Tab 3   • escitalopram  (LEXAPRO) 20 MG tablet Take 1 Tab by mouth every day. 90 Tab 1   • metformin (GLUCOPHAGE) 500 MG Tab Take 1 Tab by mouth 2 times a day, with meals. 180 Tab 2   • Multiple Vitamins-Minerals (VISION-WILFREDO PRESERVE PO) Take  by mouth.     • Multiple Vitamins-Minerals (MULTIVITAL PO) Take  by mouth.     • triamcinolone (NASACORT AQ) 55 MCG/ACT nasal inhaler Spray 2 Sprays in nose every day.     • Abatacept (ORENCIA IV) by Intravenous route.     • omega-3 acid ethyl esters (LOVAZA) 1 GM capsule Take 2 Caps by mouth 2 times a day. 120 Cap 3   • SYMBICORT 160-4.5 MCG/ACT Aerosol   0   • omeprazole (PRILOSEC) 20 MG delayed-release capsule      • methylPREDNISolone (MEDROL) 2 MG TABS Take 2 mg by mouth every day.       • diazepam (VALIUM) 5 MG Tab TAKE ONE TABLET BY MOUTH EVERY 6 HOURS AS NEEDED FOR  ANXIETY 30 Tab 0   • albuterol 108 (90 BASE) MCG/ACT Aero Soln inhalation aerosol Inhale 2 Puffs by mouth every 6 hours as needed for Shortness of Breath. 1 Inhaler 0   • metoprolol (LOPRESSOR) 25 MG Tab TAKE ONE TABLET BY MOUTH TWICE DAILY 180 Tab 2   • triamcinolone acetonide (KENALOG) 0.1 % Cream Apply to affected area(s) 2 times a day. 1 Tube 1   • celecoxib (CELEBREX) 200 MG Cap      • meloxicam (MOBIC) 15 MG tablet      • hydrocodone-acetaminophen (NORCO) 5-325 MG Tab per tablet      • albuterol (VENTOLIN OR PROVENTIL) 108 (90 BASE) MCG/ACT AERS Inhale 2 Puffs by mouth every 6 hours as needed for Shortness of Breath. 8.5 g 3   • EPINEPHrine (EPIPEN) 0.3 MG/0.3ML SILVINO 1 PEN by Injection route 1 time daily as needed. 1 Device 0   • tramadol (ULTRAM) 50 MG TABS Take 50 mg by mouth as needed.         No current facility-administered medications for this visit.     Remicade    Review of Systems:     Constitutional: Denies fevers, Denies weight changes  Eyes: Denies changes in vision, no eye pain  Ears/Nose/Throat/Mouth: Denies nasal congestion or sore throat   Cardiovascular: Denies chest pain or palpitations   Respiratory:  "Denies shortness of breath , Denies cough  Gastrointestinal/Hepatic: Denies abdominal pain, nausea, vomiting, diarrhea, constipation or GI bleeding   Genitourinary: Denies bladder dysfunction, dysuria or frequency  Musculoskeletal/Rheum: Denies  joint pain and swelling   Skin/Breast: Denies rash, denies breast lumps or discharge  Neurological: Denies headache, confusion, memory loss or focal weakness/parasthesias  Psychiatric: denies mood disorder   Endocrine: denies hx of diabetes or thyroid dysfunction  Heme/Oncology/Lymph Nodes: Denies enlarged lymph nodes, denies brusing or known bleeding disorder  Allergic/Immunologic:  hx of allergies      All other systems were reviewed and are negative (AMA/CMS criteria)      Blood pressure 150/86, pulse 56, height 1.676 m (5' 5.98\"), weight 99.791 kg (220 lb), SpO2 93 %.  General Appearance: Obese; Well developed, Well nourished, No acute distress, Non-toxic appearance.    HENT: Normocephalic, Atraumatic, Oropharynx moist mucous membranes, Dentition: Mallampati 4 OP, Nose normal.    Eyes: PERRLA, EOMI, Conjunctiva normal, No discharge.  Neck: Normal range of motion, No cervical tenderness, Supple, No stridor, no JVD . No thyromegaly. No carotid bruit.  Cardiovascular: Normal heart rate, Normal rhythm, loud S1, S2, no S3, S4; No gallops; Flow murmurs, No rubs, . Extremitites with intact distal pulses, no cyanosis, clubbing or edema. No heaves, thrills, HJR;  Peripheral pulses: carotid 2+, brachial 2+, radial 2+, ulnar 2+, femoral 2+, popliteal 1/2+, PT 2+, DP 2+;  Lungs: Respiratory effort is normal. Normal breath sounds, breath sounds crackles to auscultation bilaterally, no rales, no rhonchi, no wheezing.    Abdomen: Bowel sounds normal, Soft, No tenderness, No guarding, No rebound, No masses, No hepatosplenomegaly.  Skin: Warm, Dry, No erythema, No rash, no induration or crepitus.  Neurologic: Alert & oriented x 3, Normal motor function, Normal sensory function, No focal " deficits noted, cranial nerves II through XII are normal, normal gait.  Psychiatric: Affect normal, Judgment normal, Mood normal    Results for YARITZA REAVES (MRN [id number]) as of 8/15/2017 14:21   Ref. Range 3/2/2017 14:15 4/13/2017 09:25 6/5/2017 14:50   WBC Latest Ref Range: 4.8-10.8 K/uL 10.2  10.8   RBC Latest Ref Range: 4.70-6.10 M/uL 3.97 (L)  4.03 (L)   Hemoglobin Latest Ref Range: 14.0-18.0 g/dL 12.5 (L)  12.7 (L)   Hematocrit Latest Ref Range: 42.0-52.0 % 38.4 (L)  37.5 (L)   MCV Latest Ref Range: 80.0-94.0 fL 96.7 (H)  93.1   MCH Latest Ref Range: 27.0-31.0 pg 31.5 (H)  31.5 (H)   MCHC Latest Ref Range: 33.0-37.0 g/dL 32.6 (L)  33.9   RDW Latest Ref Range: 11.5-14.5 % 13.8  13.8   Platelet Count Latest Ref Range: 130-400 K/uL 258  243   MPV Latest Ref Range: 7.4-10.4 fL 10.2  10.0   Neutrophils Automated Latest Ref Range: 39.0-70.0 % 67.5  71.2 (H)   Abs Neutrophils Automated Latest Ref Range: 1.8-7.7 K/uL 6.9  7.7   Lymphocytes Automated Latest Ref Range: 21.0-50.0 % 19.5 (L)  17.3 (L)   Abs Lymph Automated Latest Ref Range: 1.2-4.8 K/uL 2.0  1.9   Eosinophils Automated Latest Ref Range: 0.0-5.0 % 2.6  2.9   Basophils Automated Latest Ref Range: 0.0-3.0 % 0.3  0.3   Monocytes Automated Latest Ref Range: 2.0-9.0 % 9.8 (H)  7.9   Sed Rate Westergren Latest Ref Range: 0-20 mm/hour 28 (H)     Eosinophil Count, Blood Latest Ref Range: 0.00-0.50 K/uL 0.26  0.31   Sodium Latest Ref Range: 136-145 mmol/L 143  139   Potassium Latest Ref Range: 3.5-5.1 mmol/L 4.5  3.9   Chloride Latest Ref Range:  mmol/L 107  103   Co2 Latest Ref Range: 21-32 mmol/L 24  25   Anion Gap Latest Ref Range: 10-18 mmol/L 17  15   Glucose Latest Ref Range: 74-99 mg/dL 157 (H)  143 (H)   Bun Latest Ref Range: 7-18 mg/dL 17  22 (H)   Creatinine Latest Ref Range: 0.8-1.3 mg/dL 1.2  1.3   GFR If  Latest Ref Range: >60 mL/min/1.73 m 2 >60  >60   GFR If Non  Latest Ref Range: >60 mL/min/1.73 m 2 59 (A)   54 (A)   Calcium Latest Ref Range: 8.5-11.0 mg/dL 9.2  8.8   AST(SGOT) Latest Ref Range: 15-37 U/L 31  18   ALT(SGPT) Latest Ref Range: 12-78 U/L 29  27   Alkaline Phosphatase Latest Ref Range:  U/L 48  49   Total Bilirubin Latest Ref Range: 0.2-1.0 mg/dL 0.4  0.3   Albumin Latest Ref Range: 3.4-5.0 g/dL 3.8  3.7   Total Protein Latest Ref Range: 6.4-8.2 g/dL 7.2  7.3   A-G Ratio Unknown 1.1  1.0   Cholesterol,Tot Latest Ref Range: 120-200 mg/dL  129    Triglycerides Latest Ref Range: 0-150 mg/dL  188 (H)    HDL Latest Ref Range: 40.0-60.0 mg/dL  34.0 (L)    Non HDL Cholesterol Latest Ref Range:    95    LDL Latest Ref Range: <100 mg/dL  57    Chol-Hdl Ratio Unknown  3.79        Assessment and Plan.   76 y.o. male has stable cardiac status in AFib with controlled rate; Will recheck FLP    1. PAF (paroxysmal atrial fibrillation) (CMS-HCC)  No sx's    2. Chronic anticoagulation  No bleeding    3. Essential hypertension  controlled    4. Statin intolerance  reviewed    5. Obesity (BMI 35.0-39.9 without comorbidity) (HCC)  stable    6. MARILEE (obstructive sleep apnea)  BiPAP    7. Rheumatoid arteritis  CV risk, discussed      Return to clinic in  6 , months    1. PAF (paroxysmal atrial fibrillation) (CMS-HCC)     2. Chronic anticoagulation     3. Essential hypertension     4. Statin intolerance     5. Obesity (BMI 35.0-39.9 without comorbidity) (HCC)     6. MARILEE (obstructive sleep apnea)     7. Rheumatoid arteritis

## 2017-11-14 PROBLEM — J18.9 PNEUMONIA: Status: ACTIVE | Noted: 2017-11-14

## 2017-11-14 PROBLEM — R09.02 HYPOXEMIA: Status: ACTIVE | Noted: 2017-11-14

## 2018-05-14 DIAGNOSIS — I10 ESSENTIAL HYPERTENSION: ICD-10-CM

## 2018-05-14 RX ORDER — HYDROCHLOROTHIAZIDE 25 MG/1
25 TABLET ORAL DAILY
Qty: 60 TAB | Refills: 0 | Status: SHIPPED | OUTPATIENT
Start: 2018-05-14 | End: 2018-07-26 | Stop reason: SDUPTHER

## 2018-07-26 DIAGNOSIS — I10 ESSENTIAL HYPERTENSION: ICD-10-CM

## 2018-07-30 RX ORDER — HYDROCHLOROTHIAZIDE 25 MG/1
TABLET ORAL
Qty: 45 TAB | Refills: 0 | Status: SHIPPED | OUTPATIENT
Start: 2018-07-30 | End: 2018-08-31 | Stop reason: SDUPTHER

## 2018-08-29 DIAGNOSIS — I48.0 PAF (PAROXYSMAL ATRIAL FIBRILLATION) (HCC): ICD-10-CM

## 2018-08-29 DIAGNOSIS — Z79.01 CHRONIC ANTICOAGULATION: ICD-10-CM

## 2018-08-31 ENCOUNTER — OFFICE VISIT (OUTPATIENT)
Dept: CARDIOLOGY | Facility: CLINIC | Age: 78
End: 2018-08-31
Payer: MEDICARE

## 2018-08-31 VITALS
WEIGHT: 205 LBS | OXYGEN SATURATION: 92 % | BODY MASS INDEX: 32.95 KG/M2 | DIASTOLIC BLOOD PRESSURE: 80 MMHG | HEART RATE: 59 BPM | HEIGHT: 66 IN | SYSTOLIC BLOOD PRESSURE: 170 MMHG

## 2018-08-31 DIAGNOSIS — Z79.01 CHRONIC ANTICOAGULATION: Chronic | ICD-10-CM

## 2018-08-31 DIAGNOSIS — I10 ESSENTIAL HYPERTENSION: Chronic | ICD-10-CM

## 2018-08-31 DIAGNOSIS — I48.0 PAF (PAROXYSMAL ATRIAL FIBRILLATION) (HCC): Chronic | ICD-10-CM

## 2018-08-31 DIAGNOSIS — G47.33 OSA ON CPAP: ICD-10-CM

## 2018-08-31 DIAGNOSIS — R00.2 PALPITATIONS: ICD-10-CM

## 2018-08-31 DIAGNOSIS — E78.5 DYSLIPIDEMIA: Chronic | ICD-10-CM

## 2018-08-31 PROCEDURE — 99214 OFFICE O/P EST MOD 30 MIN: CPT | Performed by: INTERNAL MEDICINE

## 2018-08-31 RX ORDER — AMLODIPINE BESYLATE 10 MG/1
10 TABLET ORAL DAILY
Qty: 90 TAB | Refills: 3 | Status: SHIPPED | OUTPATIENT
Start: 2018-08-31 | End: 2021-06-18

## 2018-08-31 RX ORDER — ATORVASTATIN CALCIUM 20 MG/1
20 TABLET, FILM COATED ORAL DAILY
Qty: 90 TAB | Refills: 3 | Status: SHIPPED | OUTPATIENT
Start: 2018-08-31 | End: 2019-10-03 | Stop reason: SDUPTHER

## 2018-08-31 RX ORDER — METHOTREXATE 2.5 MG/1
TABLET ORAL
COMMUNITY
Start: 2018-06-23 | End: 2019-10-16

## 2018-08-31 RX ORDER — OMEGA-3 FATTY ACIDS/FISH OIL 300-1000MG
1000 CAPSULE ORAL 2 TIMES DAILY WITH MEALS
Qty: 180 CAP | Refills: 3 | COMMUNITY
Start: 2018-08-31 | End: 2022-11-18

## 2018-08-31 RX ORDER — HYDROCHLOROTHIAZIDE 25 MG/1
25 TABLET ORAL DAILY
Qty: 90 TAB | Refills: 3 | Status: SHIPPED | OUTPATIENT
Start: 2018-08-31 | End: 2019-09-27 | Stop reason: SDUPTHER

## 2018-08-31 RX ORDER — LISINOPRIL 40 MG/1
40 TABLET ORAL DAILY
Qty: 90 TAB | Refills: 3 | Status: SHIPPED | OUTPATIENT
Start: 2018-08-31 | End: 2019-09-23 | Stop reason: SDUPTHER

## 2018-08-31 ASSESSMENT — ENCOUNTER SYMPTOMS
CHILLS: 0
SPUTUM PRODUCTION: 0
SHORTNESS OF BREATH: 1
PALPITATIONS: 0
BRUISES/BLEEDS EASILY: 0
SORE THROAT: 0
NAUSEA: 0
CLAUDICATION: 0
ABDOMINAL PAIN: 0
FOCAL WEAKNESS: 0
WEAKNESS: 0
PND: 0
COUGH: 1
BLURRED VISION: 0
FALLS: 0
FEVER: 0
DIZZINESS: 0

## 2018-08-31 NOTE — PROGRESS NOTES
Chief Complaint   Patient presents with   • Atrial Fibrillation       Subjective:   Alexis Randall is a 77 y.o. male who presents today for follow-up of his history of paroxysmal atrial fibrillation on Zio patch monitor and chronic anticoagulation     last years been eventful he did Fall off a haystack and had a concussion likely no intracranial bleeding is also had a couple hospitalizations for COPD    Past Medical History:   Diagnosis Date   • Arthritis     rheumatoid   • Chronic anticoagulation    • Depression    • Diabetes (HCC)    • Dyslipidemia    • Essential hypertension    • Hypertension    • Macular degeneration    • PAF (paroxysmal atrial fibrillation) ([facility])      Past Surgical History:   Procedure Laterality Date   • LAMINOTOMY  1984    double discectomy lumbar   • ATHROPLASTY      bilateral knees   • EYE SURGERY      bilateral cataracts     Family History   Problem Relation Age of Onset   • Heart Disease Mother      Social History     Social History   • Marital status:      Spouse name: N/A   • Number of children: N/A   • Years of education: N/A     Occupational History   • Not on file.     Social History Main Topics   • Smoking status: Former Smoker     Years: 24.00     Types: Cigarettes     Quit date: 1/1/1984   • Smokeless tobacco: Former User   • Alcohol use Yes   • Drug use: No   • Sexual activity: Not on file     Other Topics Concern   • Not on file     Social History Narrative    .    Former .     Allergies   Allergen Reactions   • Remicade [Infliximab Injection] Anaphylaxis     Pt allergy to Remicade, takes pre treatment of Benadryl and SoluMedrol and is able to tolerate infusion.   • Bee Venom Anaphylaxis     Carries epi pen     Outpatient Encounter Prescriptions as of 8/31/2018   Medication Sig Dispense Refill   • Omega 3 1000 MG Cap Take 1,000 mg by mouth 2 times a day, with meals. 180 Cap 3   • rivaroxaban (XARELTO) 20 MG Tab tablet Take 1 Tab by mouth with dinner.  90 Tab 3   • atorvastatin (LIPITOR) 20 MG Tab Take 1 Tab by mouth every day. 90 Tab 3   • amLODIPine (NORVASC) 10 MG Tab Take 1 Tab by mouth every day. 90 Tab 3   • lisinopril (PRINIVIL, ZESTRIL) 40 MG tablet Take 1 Tab by mouth every day. 90 Tab 3   • metoprolol (LOPRESSOR) 25 MG Tab Take 1 Tab by mouth 2 times a day. 180 Tab 3   • hydroCHLOROthiazide (HYDRODIURIL) 25 MG Tab Take 1 Tab by mouth every day. 90 Tab 3   • metFORMIN (GLUCOPHAGE) 500 MG Tab TAKE ONE TABLET BY MOUTH TWICE DAILY WITH  MEALS 180 Tab 1   • predniSONE (DELTASONE) 10 MG Tab 4 tabs PO daily x2 days, then 3 tabs PO daily x2 days, then 2 tabs PO daily x2 days, then 1 tab PO daily x2 days, then stop 30 Tab 0   • escitalopram (LEXAPRO) 20 MG tablet TAKE ONE TABLET BY MOUTH ONCE DAILY 90 Tab 2   • celecoxib (CELEBREX) 200 MG Cap      • omeprazole (PRILOSEC) 20 MG delayed-release capsule      • tramadol (ULTRAM) 50 MG TABS Take 50 mg by mouth as needed.       • methotrexate 2.5 MG tablet      • abatacept (ORENCIA) 250 MG Recon Soln by Intravenous route.     • [DISCONTINUED] hydroCHLOROthiazide (HYDRODIURIL) 25 MG Tab TAKE 1 TABLET BY MOUTH ONCE DAILY *NEEDS  TO  BE  SEEN  FOR  FURTHER  REFILLS* 45 Tab 0   • [DISCONTINUED] metoprolol (LOPRESSOR) 25 MG Tab TAKE ONE TABLET BY MOUTH TWICE DAILY 180 Tab 2   • [DISCONTINUED] lisinopril (PRINIVIL, ZESTRIL) 40 MG tablet Take 1 Tab by mouth every day. 30 Tab 0   • [DISCONTINUED] amLODIPine (NORVASC) 10 MG Tab Take 1 Tab by mouth every day. 30 Tab 0   • [DISCONTINUED] atorvastatin (LIPITOR) 20 MG Tab TAKE ONE TABLET BY MOUTH ONCE DAILY 90 Tab 1   • albuterol 108 (90 Base) MCG/ACT Aero Soln inhalation aerosol Inhale 2 Puffs by mouth every four hours as needed for Shortness of Breath. 1 Inhaler 1   • PROAIR  (90 Base) MCG/ACT Aero Soln inhalation aerosol INHALE TWO PUFFS BY MOUTH EVERY 6 HOURS AS NEEDED FOR SHORTNESS OF BREATH 3 Inhaler 1   • [DISCONTINUED] rivaroxaban (XARELTO) 20 MG Tab tablet Take 1  "Tab by mouth with dinner. 90 Tab 3   • [DISCONTINUED] omega-3 acid ethyl esters (LOVAZA) 1 GM capsule Take 2 Caps by mouth 2 times a day. 120 Cap 3     No facility-administered encounter medications on file as of 8/31/2018.      Review of Systems   Constitutional: Negative for chills and fever.   HENT: Negative for sore throat.    Eyes: Negative for blurred vision.   Respiratory: Positive for cough and shortness of breath. Negative for sputum production.    Cardiovascular: Negative for chest pain, palpitations, claudication, leg swelling and PND.   Gastrointestinal: Negative for abdominal pain and nausea.   Musculoskeletal: Negative for falls and joint pain.   Skin: Negative for rash.   Neurological: Negative for dizziness, focal weakness and weakness.   Endo/Heme/Allergies: Does not bruise/bleed easily.        Objective:   BP (!) 170/80   Pulse (!) 59   Ht 1.676 m (5' 6\")   Wt 93 kg (205 lb)   SpO2 92%   BMI 33.09 kg/m²     Physical Exam   Constitutional: No distress.   HENT:   Mouth/Throat: Oropharynx is clear and moist. No oropharyngeal exudate.   Eyes: No scleral icterus.   Neck: No JVD present.   Cardiovascular: Normal rate and normal heart sounds.  Exam reveals no gallop and no friction rub.    No murmur heard.  Pulmonary/Chest: No respiratory distress. He has no wheezes. He has no rales.   Abdominal: Soft. Bowel sounds are normal.   Musculoskeletal: He exhibits no edema.   Neurological: He is alert.   Skin: No rash noted. He is not diaphoretic.   Psychiatric: He has a normal mood and affect.     Reviewed his labs and testing  Assessment:     1. Palpitations     2. PAF (paroxysmal atrial fibrillation) (HCC)  rivaroxaban (XARELTO) 20 MG Tab tablet   3. Chronic anticoagulation  rivaroxaban (XARELTO) 20 MG Tab tablet   4. Essential hypertension  hydroCHLOROthiazide (HYDRODIURIL) 25 MG Tab   5. Dyslipidemia     6. MARILEE on CPAP  REFERRAL TO PULMONOLOGY       Medical Decision Making:  Today's Assessment / Status " / Plan:   He is accompanied by his wife    It was my pleasure to meet with Mr. Randall.    He is taking care of 2 grandchildren who are now adopted 9and 6 years old    Blood pressure is high today typically under control encouraged him to monitor    He understands importance of chronic anticoagulation I encouraged him to be more mindful of risk for injury with his history of fall    He will need to establish with new pulmonologist,    He is on proper statin excellent control of lipids recently    I will see Mr. Randall back in 1 year time and encouraged him to follow up with us over the phone or e-mail using my MyChart as issues arise.    It is my pleasure to participate in the care of Mr. Randall.  Please do not hesitate to contact me with questions or concerns.    Lauri العلي MD PhD FACC  Cardiologist [facility]

## 2018-09-05 ENCOUNTER — ANTICOAGULATION MONITORING (OUTPATIENT)
Dept: VASCULAR LAB | Facility: MEDICAL CENTER | Age: 78
End: 2018-09-05

## 2018-09-05 ENCOUNTER — TELEPHONE (OUTPATIENT)
Dept: CARDIOLOGY | Facility: MEDICAL CENTER | Age: 78
End: 2018-09-05

## 2018-09-05 NOTE — PROGRESS NOTES
Pt presents to the [place] office for Xarelto samples.  Labs reviewed and WNL  Lina Tolliver, PharmD

## 2018-09-06 NOTE — TELEPHONE ENCOUNTER
Letter received from [facility] stating they are unable to contact patient due to constant busy signal.  Spoke with patient and he will call them @ [phone].

## 2019-01-25 PROBLEM — R09.81 NASAL CONGESTION: Status: ACTIVE | Noted: 2019-01-25

## 2019-01-25 PROBLEM — R05.9 COUGH: Status: ACTIVE | Noted: 2019-01-25

## 2019-01-25 PROBLEM — R09.81 SINUS CONGESTION: Status: ACTIVE | Noted: 2019-01-25

## 2019-01-25 PROBLEM — J04.0 LARYNGITIS: Status: ACTIVE | Noted: 2019-01-25

## 2019-01-25 PROBLEM — R06.2 WHEEZING: Status: ACTIVE | Noted: 2019-01-25

## 2019-01-25 PROBLEM — J40 BRONCHITIS: Status: ACTIVE | Noted: 2019-01-25

## 2019-01-25 PROBLEM — R53.81 MALAISE: Status: ACTIVE | Noted: 2019-01-25

## 2019-03-05 PROBLEM — E11.9 CONTROLLED TYPE 2 DIABETES MELLITUS, WITHOUT LONG-TERM CURRENT USE OF INSULIN (HCC): Status: ACTIVE | Noted: 2019-03-05

## 2019-09-23 DIAGNOSIS — Z98.890 H/O HAND SURGERY: ICD-10-CM

## 2019-09-23 DIAGNOSIS — I48.0 PAF (PAROXYSMAL ATRIAL FIBRILLATION) (HCC): Primary | Chronic | ICD-10-CM

## 2019-09-23 RX ORDER — LISINOPRIL 40 MG/1
40 TABLET ORAL DAILY
Qty: 30 TAB | Refills: 0 | Status: SHIPPED | OUTPATIENT
Start: 2019-09-23 | End: 2019-10-22 | Stop reason: SDUPTHER

## 2019-09-27 DIAGNOSIS — I10 ESSENTIAL HYPERTENSION: Chronic | ICD-10-CM

## 2019-09-27 RX ORDER — HYDROCHLOROTHIAZIDE 25 MG/1
TABLET ORAL
Qty: 90 TAB | Refills: 0 | Status: SHIPPED | OUTPATIENT
Start: 2019-09-27 | End: 2020-03-02

## 2019-10-01 ENCOUNTER — OFFICE VISIT (OUTPATIENT)
Dept: CARDIOLOGY | Facility: MEDICAL CENTER | Age: 79
End: 2019-10-01
Payer: MEDICARE

## 2019-10-01 VITALS
SYSTOLIC BLOOD PRESSURE: 138 MMHG | DIASTOLIC BLOOD PRESSURE: 76 MMHG | BODY MASS INDEX: 33.59 KG/M2 | WEIGHT: 209 LBS | OXYGEN SATURATION: 97 % | HEIGHT: 66 IN | HEART RATE: 52 BPM

## 2019-10-01 DIAGNOSIS — I10 ESSENTIAL HYPERTENSION: Chronic | ICD-10-CM

## 2019-10-01 DIAGNOSIS — E78.1 HYPERTRIGLYCERIDEMIA: ICD-10-CM

## 2019-10-01 DIAGNOSIS — Z79.01 CHRONIC ANTICOAGULATION: Chronic | ICD-10-CM

## 2019-10-01 DIAGNOSIS — I48.0 PAF (PAROXYSMAL ATRIAL FIBRILLATION) (HCC): Chronic | ICD-10-CM

## 2019-10-01 DIAGNOSIS — E78.5 DYSLIPIDEMIA: Chronic | ICD-10-CM

## 2019-10-01 PROCEDURE — 99214 OFFICE O/P EST MOD 30 MIN: CPT | Performed by: INTERNAL MEDICINE

## 2019-10-01 RX ORDER — METHYLPREDNISOLONE 4 MG/1
TABLET ORAL
Refills: 3 | COMMUNITY
Start: 2019-08-23 | End: 2020-01-23

## 2019-10-01 RX ORDER — DIAZEPAM 5 MG/1
TABLET ORAL
Refills: 0 | COMMUNITY
Start: 2019-07-24 | End: 2020-01-23

## 2019-10-01 RX ORDER — TRAMADOL HYDROCHLORIDE 50 MG/1
50 TABLET ORAL EVERY 6 HOURS PRN
COMMUNITY
Start: 2019-09-28 | End: 2023-11-27

## 2019-10-01 NOTE — PROGRESS NOTES
Chief Complaint   Patient presents with   • Palpitations       Subjective:   Alexis Randall is a 79 y.o. male who presents today for follow-up of his history of paroxysmal atrial fibrillation chronic anticoagulation    He is been doing well no significant injuries as he had previously tolerating his medications well no significant palpitations    Past Medical History:   Diagnosis Date   • Arthritis     rheumatoid   • Chronic anticoagulation    • Depression    • Diabetes (HCC)    • Dyslipidemia    • Essential hypertension    • Hypertension    • Macular degeneration    • PAF (paroxysmal atrial fibrillation) (HCC)      Past Surgical History:   Procedure Laterality Date   • LAMINOTOMY      double discectomy lumbar   • ATHROPLASTY      bilateral knees   • EYE SURGERY      bilateral cataracts     Family History   Problem Relation Age of Onset   • Heart Disease Mother      Social History     Socioeconomic History   • Marital status:      Spouse name: Not on file   • Number of children: Not on file   • Years of education: Not on file   • Highest education level: Not on file   Occupational History   • Not on file   Social Needs   • Financial resource strain: Not on file   • Food insecurity:     Worry: Not on file     Inability: Not on file   • Transportation needs:     Medical: Not on file     Non-medical: Not on file   Tobacco Use   • Smoking status: Former Smoker     Years: 24.00     Types: Cigarettes     Last attempt to quit: 1984     Years since quittin.7   • Smokeless tobacco: Former User   Substance and Sexual Activity   • Alcohol use: Yes   • Drug use: No   • Sexual activity: Not on file   Lifestyle   • Physical activity:     Days per week: Not on file     Minutes per session: Not on file   • Stress: Not on file   Relationships   • Social connections:     Talks on phone: Not on file     Gets together: Not on file     Attends [employer] service: Not on file     Active member of club or organization:  Not on file     Attends meetings of clubs or organizations: Not on file     Relationship status: Not on file   • Intimate partner violence:     Fear of current or ex partner: Not on file     Emotionally abused: Not on file     Physically abused: Not on file     Forced sexual activity: Not on file   Other Topics Concern   • Not on file   Social History Narrative    .    Former .     Allergies   Allergen Reactions   • Remicade [Infliximab Injection] Anaphylaxis     Pt allergy to Remicade, takes pre treatment of Benadryl and SoluMedrol and is able to tolerate infusion.   • Bee Venom Anaphylaxis     Carries epi pen     Outpatient Encounter Medications as of 10/1/2019   Medication Sig Dispense Refill   • methylPREDNISolone (MEDROL) 4 MG Tab TAKE 1 2 (ONE HALF) TABLET BY MOUTH ONCE DAILY FOR 90 DAYS  3   • diazePAM (VALIUM) 5 MG Tab TAKE 1 TABLET 1 2 HOUR PRIOR TO PROCEDURE ONE TABLET ON ARRIVAL IF NECESSARY TWICE A DAY AS NEEDED FOR 1 DAY  0   • hydroCHLOROthiazide (HYDRODIURIL) 25 MG Tab TAKE 1 TABLET BY MOUTH ONCE DAILY 90 Tab 0   • lisinopril (PRINIVIL, ZESTRIL) 40 MG tablet Take 1 Tab by mouth every day. Needs to be seen for further refills. Thank you 30 Tab 0   • metFORMIN (GLUCOPHAGE) 500 MG Tab TAKE 1 TABLET BY MOUTH TWICE DAILY WITH  MEALS 180 Tab 0   • fluticasone (FLONASE) 50 MCG/ACT nasal spray Spray 2 Sprays in nose 2 times a day. 1 Bottle 2   • escitalopram (LEXAPRO) 20 MG tablet TAKE 1 TABLET BY MOUTH ONCE DAILY 90 Tab 0   • abatacept (ORENCIA) 250 MG Recon Soln by Intravenous route.     • Omega 3 1000 MG Cap Take 1,000 mg by mouth 2 times a day, with meals. 180 Cap 3   • atorvastatin (LIPITOR) 20 MG Tab Take 1 Tab by mouth every day. 90 Tab 3   • amLODIPine (NORVASC) 10 MG Tab Take 1 Tab by mouth every day. 90 Tab 3   • metoprolol (LOPRESSOR) 25 MG Tab Take 1 Tab by mouth 2 times a day. 180 Tab 3   • predniSONE (DELTASONE) 10 MG Tab 4 tabs PO daily x2 days, then 3 tabs PO daily x2 days,  then 2 tabs PO daily x2 days, then 1 tab PO daily x2 days, then stop 30 Tab 0   • albuterol 108 (90 Base) MCG/ACT Aero Soln inhalation aerosol Inhale 2 Puffs by mouth every four hours as needed for Shortness of Breath. 1 Inhaler 1   • celecoxib (CELEBREX) 200 MG Cap      • omeprazole (PRILOSEC) 20 MG delayed-release capsule      • tramadol (ULTRAM) 50 MG Tab      • escitalopram (LEXAPRO) 20 MG tablet TAKE 1 TABLET BY MOUTH ONCE DAILY (Patient not taking: Reported on 10/1/2019) 90 Tab 0   • doxycycline (VIBRAMYCIN) 100 MG Tab Take 1 Tab by mouth 2 times a day. (Patient not taking: Reported on 10/1/2019) 20 Tab 0   • loratadine (CLARITIN) 10 MG Tab Take 1 Tab by mouth every day. (Patient not taking: Reported on 10/1/2019) 90 Tab 1   • doxycycline (VIBRAMYCIN) 100 MG Tab Take 1 Tab by mouth 2 times a day. (Patient not taking: Reported on 4/8/2019) 20 Tab 0   • methotrexate 2.5 MG tablet      • [DISCONTINUED] rivaroxaban (XARELTO) 20 MG Tab tablet Take 1 Tab by mouth with dinner. 90 Tab 3   • PROAIR  (90 Base) MCG/ACT Aero Soln inhalation aerosol INHALE TWO PUFFS BY MOUTH EVERY 6 HOURS AS NEEDED FOR SHORTNESS OF BREATH (Patient not taking: Reported on 10/1/2019) 3 Inhaler 1     No facility-administered encounter medications on file as of 10/1/2019.      Review of Systems   Constitutional: Negative for chills and fever.   HENT: Negative for sore throat.    Eyes: Negative for blurred vision.   Respiratory: Negative for cough and shortness of breath.    Cardiovascular: Negative for chest pain, palpitations, claudication, leg swelling and PND.   Gastrointestinal: Negative for abdominal pain and nausea.   Musculoskeletal: Negative for falls and joint pain.   Skin: Negative for rash.   Neurological: Negative for dizziness, focal weakness and weakness.   Endo/Heme/Allergies: Does not bruise/bleed easily.        Objective:   /76 (BP Location: Left arm, Patient Position: Sitting, BP Cuff Size: Adult)   Pulse (!) 52  "  Ht 1.676 m (5' 6\")   Wt 94.8 kg (209 lb)   SpO2 97%   BMI 33.73 kg/m²     Physical Exam   Constitutional: No distress.   HENT:   Mouth/Throat: Oropharynx is clear and moist. No oropharyngeal exudate.   Eyes: No scleral icterus.   Neck: No JVD present.   Cardiovascular: Normal rate and normal heart sounds. Exam reveals no gallop and no friction rub.   No murmur heard.  Pulmonary/Chest: No respiratory distress. He has no wheezes. He has no rales.   Abdominal: Soft. Bowel sounds are normal.   Musculoskeletal: He exhibits no edema.   Neurological: He is alert.   Skin: No rash noted. He is not diaphoretic.   Psychiatric: He has a normal mood and affect.     We reviewed in person the most recent labs  Recent Results (from the past 4032 hour(s))   CBC WITH DIFFERENTIAL    Collection Time: 05/08/19  2:25 PM   Result Value Ref Range    WBC 9.8 4.8 - 10.8 K/uL    RBC 4.39 (L) 4.70 - 6.10 M/uL    Hemoglobin 13.2 (L) 14.0 - 18.0 g/dL    Hematocrit 39.8 (L) 42.0 - 52.0 %    MCV 90.7 80.0 - 94.0 fL    MCH 30.1 27.0 - 31.0 pg    MCHC 33.2 33.0 - 37.0 g/dL    RDW 14.1 11.5 - 14.5 %    Platelet Count 220 130 - 400 K/uL    MPV 10.6 (H) 7.4 - 10.4 fL    Neutrophils Automated 63.0 39.0 - 70.0 %    Lymphocytes Automated 22.0 21.0 - 50.0 %    Monocytes Automated 11.0 (H) 2.0 - 9.0 %    Eosinophils Automated 3.3 0.0 - 5.0 %    Basophils Automated 0.3 0.0 - 3.0 %    Abs Neutrophils Automated 6.2 1.8 - 7.7 K/uL    Abs Lymph Automated 2.2 1.2 - 4.8 K/uL    Eosinophil Count, Blood 0.32 0.00 - 0.50 K/uL   Comp Metabolic Panel    Collection Time: 05/08/19  2:25 PM   Result Value Ref Range    Sodium 138 136 - 145 mmol/L    Potassium 4.8 3.5 - 5.1 mmol/L    Chloride 104 98 - 107 mmol/L    Co2 25 21 - 32 mmol/L    Anion Gap 14 10 - 18 mmol/L    Glucose 119 (H) 74 - 99 mg/dL    Bun 19 (H) 7 - 18 mg/dL    Creatinine 1.1 0.8 - 1.3 mg/dL    Calcium 8.8 8.5 - 11.0 mg/dL    AST(SGOT) 35 15 - 37 U/L    ALT(SGPT) 30 12 - 78 U/L    Alkaline " Phosphatase 52 46 - 116 U/L    Total Bilirubin 0.5 0.2 - 1.0 mg/dL    Albumin 3.7 3.4 - 5.0 g/dL    Total Protein 7.2 6.4 - 8.2 g/dL    A-G Ratio 1.1    ESTIMATED GFR    Collection Time: 05/08/19  2:25 PM   Result Value Ref Range    GFR If African American >60 >60 mL/min/1.73 m 2    GFR If Non African American >60 >60 mL/min/1.73 m 2   CBC WITH DIFFERENTIAL    Collection Time: 07/27/19 12:25 PM   Result Value Ref Range    WBC 8.7 4.8 - 10.8 K/uL    RBC 4.28 (L) 4.70 - 6.10 M/uL    Hemoglobin 12.8 (L) 14.0 - 18.0 g/dL    Hematocrit 38.6 (L) 42.0 - 52.0 %    MCV 90.2 80.0 - 94.0 fL    MCH 29.9 27.0 - 31.0 pg    MCHC 33.2 33.0 - 37.0 g/dL    RDW 13.3 11.5 - 14.5 %    Platelet Count 232 130 - 400 K/uL    MPV 10.7 (H) 7.4 - 10.4 fL    Neutrophils Automated 68.4 39.0 - 70.0 %    Lymphocytes Automated 20.5 (L) 21.0 - 50.0 %    Monocytes Automated 8.1 2.0 - 9.0 %    Eosinophils Automated 2.6 0.0 - 5.0 %    Basophils Automated 0.2 0.0 - 3.0 %    Abs Neutrophils Automated 6.0 1.8 - 7.7 K/uL    Abs Lymph Automated 1.8 1.2 - 4.8 K/uL    Eosinophil Count, Blood 0.23 0.00 - 0.50 K/uL   TSH    Collection Time: 07/27/19 12:25 PM   Result Value Ref Range    TSH 0.74 0.36 - 3.74 uIU/mL   FREE THYROXINE    Collection Time: 07/27/19 12:25 PM   Result Value Ref Range    Free T-4 0.94 0.76 - 1.46 ng/dL   Comp Metabolic Panel    Collection Time: 07/27/19 12:25 PM   Result Value Ref Range    Sodium 137 136 - 145 mmol/L    Potassium 3.9 3.5 - 5.1 mmol/L    Chloride 102 98 - 107 mmol/L    Co2 24 21 - 32 mmol/L    Anion Gap 15 10 - 18 mmol/L    Glucose 206 (H) 74 - 99 mg/dL    Bun 26 (H) 7 - 18 mg/dL    Creatinine 1.4 (H) 0.8 - 1.3 mg/dL    Calcium 8.9 8.5 - 11.0 mg/dL    AST(SGOT) 24 15 - 37 U/L    ALT(SGPT) 28 12 - 78 U/L    Alkaline Phosphatase 58 46 - 116 U/L    Total Bilirubin 0.4 0.2 - 1.0 mg/dL    Albumin 3.6 3.4 - 5.0 g/dL    Total Protein 7.1 6.4 - 8.2 g/dL    A-G Ratio 1.0    ESTIMATED GFR    Collection Time: 07/27/19 12:25 PM    Result Value Ref Range    GFR If  59 (A) >60 mL/min/1.73 m 2    GFR If Non African American 49 (A) >60 mL/min/1.73 m 2         We reviewed in person the most recent labs  Recent Results (from the past 4032 hour(s))   CBC WITH DIFFERENTIAL    Collection Time: 05/08/19  2:25 PM   Result Value Ref Range    WBC 9.8 4.8 - 10.8 K/uL    RBC 4.39 (L) 4.70 - 6.10 M/uL    Hemoglobin 13.2 (L) 14.0 - 18.0 g/dL    Hematocrit 39.8 (L) 42.0 - 52.0 %    MCV 90.7 80.0 - 94.0 fL    MCH 30.1 27.0 - 31.0 pg    MCHC 33.2 33.0 - 37.0 g/dL    RDW 14.1 11.5 - 14.5 %    Platelet Count 220 130 - 400 K/uL    MPV 10.6 (H) 7.4 - 10.4 fL    Neutrophils Automated 63.0 39.0 - 70.0 %    Lymphocytes Automated 22.0 21.0 - 50.0 %    Monocytes Automated 11.0 (H) 2.0 - 9.0 %    Eosinophils Automated 3.3 0.0 - 5.0 %    Basophils Automated 0.3 0.0 - 3.0 %    Abs Neutrophils Automated 6.2 1.8 - 7.7 K/uL    Abs Lymph Automated 2.2 1.2 - 4.8 K/uL    Eosinophil Count, Blood 0.32 0.00 - 0.50 K/uL   Comp Metabolic Panel    Collection Time: 05/08/19  2:25 PM   Result Value Ref Range    Sodium 138 136 - 145 mmol/L    Potassium 4.8 3.5 - 5.1 mmol/L    Chloride 104 98 - 107 mmol/L    Co2 25 21 - 32 mmol/L    Anion Gap 14 10 - 18 mmol/L    Glucose 119 (H) 74 - 99 mg/dL    Bun 19 (H) 7 - 18 mg/dL    Creatinine 1.1 0.8 - 1.3 mg/dL    Calcium 8.8 8.5 - 11.0 mg/dL    AST(SGOT) 35 15 - 37 U/L    ALT(SGPT) 30 12 - 78 U/L    Alkaline Phosphatase 52 46 - 116 U/L    Total Bilirubin 0.5 0.2 - 1.0 mg/dL    Albumin 3.7 3.4 - 5.0 g/dL    Total Protein 7.2 6.4 - 8.2 g/dL    A-G Ratio 1.1    ESTIMATED GFR    Collection Time: 05/08/19  2:25 PM   Result Value Ref Range    GFR If African American >60 >60 mL/min/1.73 m 2    GFR If Non African American >60 >60 mL/min/1.73 m 2   CBC WITH DIFFERENTIAL    Collection Time: 07/27/19 12:25 PM   Result Value Ref Range    WBC 8.7 4.8 - 10.8 K/uL    RBC 4.28 (L) 4.70 - 6.10 M/uL    Hemoglobin 12.8 (L) 14.0 - 18.0 g/dL     Hematocrit 38.6 (L) 42.0 - 52.0 %    MCV 90.2 80.0 - 94.0 fL    MCH 29.9 27.0 - 31.0 pg    MCHC 33.2 33.0 - 37.0 g/dL    RDW 13.3 11.5 - 14.5 %    Platelet Count 232 130 - 400 K/uL    MPV 10.7 (H) 7.4 - 10.4 fL    Neutrophils Automated 68.4 39.0 - 70.0 %    Lymphocytes Automated 20.5 (L) 21.0 - 50.0 %    Monocytes Automated 8.1 2.0 - 9.0 %    Eosinophils Automated 2.6 0.0 - 5.0 %    Basophils Automated 0.2 0.0 - 3.0 %    Abs Neutrophils Automated 6.0 1.8 - 7.7 K/uL    Abs Lymph Automated 1.8 1.2 - 4.8 K/uL    Eosinophil Count, Blood 0.23 0.00 - 0.50 K/uL   TSH    Collection Time: 07/27/19 12:25 PM   Result Value Ref Range    TSH 0.74 0.36 - 3.74 uIU/mL   FREE THYROXINE    Collection Time: 07/27/19 12:25 PM   Result Value Ref Range    Free T-4 0.94 0.76 - 1.46 ng/dL   Comp Metabolic Panel    Collection Time: 07/27/19 12:25 PM   Result Value Ref Range    Sodium 137 136 - 145 mmol/L    Potassium 3.9 3.5 - 5.1 mmol/L    Chloride 102 98 - 107 mmol/L    Co2 24 21 - 32 mmol/L    Anion Gap 15 10 - 18 mmol/L    Glucose 206 (H) 74 - 99 mg/dL    Bun 26 (H) 7 - 18 mg/dL    Creatinine 1.4 (H) 0.8 - 1.3 mg/dL    Calcium 8.9 8.5 - 11.0 mg/dL    AST(SGOT) 24 15 - 37 U/L    ALT(SGPT) 28 12 - 78 U/L    Alkaline Phosphatase 58 46 - 116 U/L    Total Bilirubin 0.4 0.2 - 1.0 mg/dL    Albumin 3.6 3.4 - 5.0 g/dL    Total Protein 7.1 6.4 - 8.2 g/dL    A-G Ratio 1.0    ESTIMATED GFR    Collection Time: 07/27/19 12:25 PM   Result Value Ref Range    GFR If  59 (A) >60 mL/min/1.73 m 2    GFR If Non African American 49 (A) >60 mL/min/1.73 m 2       Assessment:     1. Chronic anticoagulation  REFERRAL TO ANTICOAGULATION MONITORING   2. Dyslipidemia     3. Essential hypertension     4. Hypertriglyceridemia     5. PAF (paroxysmal atrial fibrillation) (HCC)  REFERRAL TO ANTICOAGULATION MONITORING       Medical Decision Making:  Today's Assessment / Status / Plan:     It was my pleasure to meet with Mr. Randall.    Blood pressure is  well controlled.      He understands the risks and benefits of chronic anticoagulation switch to coumadin from Xarelto due to cost    I will see Mr. Randall back in 1 year time and encouraged him to follow up with us over the phone or electronically using my [employer] as issues arise.    It is my pleasure to participate in the care of Mr. Randall.  Please do not hesitate to contact me with questions or concerns.    Lauri العلي MD PhD [facility]  Cardiologist [facility]    Please note that this dictation was created using voice recognition software. I have worked with consultants from the vendor as well as technical experts from [facility] to optimize the interface. I have made every reasonable attempt to correct obvious errors, but I expect that there are errors of grammar and possibly content I did not discover before finalizing the note.

## 2019-10-03 DIAGNOSIS — E78.5 DYSLIPIDEMIA: Primary | Chronic | ICD-10-CM

## 2019-10-03 RX ORDER — ATORVASTATIN CALCIUM 20 MG/1
TABLET, FILM COATED ORAL
Qty: 90 TAB | Refills: 3 | Status: SHIPPED | OUTPATIENT
Start: 2019-10-03 | End: 2020-09-22

## 2019-10-04 ASSESSMENT — ENCOUNTER SYMPTOMS
FALLS: 0
PND: 0
BLURRED VISION: 0
PALPITATIONS: 0
NAUSEA: 0
SHORTNESS OF BREATH: 0
COUGH: 0
CHILLS: 0
CLAUDICATION: 0
SORE THROAT: 0
ABDOMINAL PAIN: 0
BRUISES/BLEEDS EASILY: 0
DIZZINESS: 0
FEVER: 0
FOCAL WEAKNESS: 0
WEAKNESS: 0

## 2019-10-16 ENCOUNTER — ANTICOAGULATION VISIT (OUTPATIENT)
Dept: MEDICAL GROUP | Facility: PHYSICIAN GROUP | Age: 79
End: 2019-10-16
Payer: MEDICARE

## 2019-10-16 DIAGNOSIS — I48.0 PAF (PAROXYSMAL ATRIAL FIBRILLATION) (HCC): ICD-10-CM

## 2019-10-16 DIAGNOSIS — Z79.01 CHRONIC ANTICOAGULATION: ICD-10-CM

## 2019-10-16 PROCEDURE — 99211 OFF/OP EST MAY X REQ PHY/QHP: CPT | Performed by: FAMILY MEDICINE

## 2019-10-16 NOTE — PROGRESS NOTES
Target end date:Indefinite     Indication: Afib     Drug: Xarelto 20 qPM     CHADsVASC = 4    HPI  Pt was referred to our clinic in anticipation of transitioning from Xarelto to warfarin d/t cost (~$100 copay). Explained the differences between both anticoagulants and the advantages and disadvantages for both. After an extensive discussion, pt is not willing to transition to warfarin at this time d/t the inconvenience of periodic INR checks.     Health Status Since Last Assessment   Patient denies any new relevant medical problems, ED visits or hospitalizations   Patient denies any embolic events (stroke/tia/systemic embolism)    Adherence with DOAC Therapy   Pt has not missed any doses in the average week    Bleeding Risk Assessment     Denies Epistaxis   Pt denies any excessive or unusual bleeding/hematomas.  Pt denies any GI bleeds or hematemesis.  Pt denies any concerning daily headache or sub dural hematoma symptoms.     Pt denies any hematuria or abnormal vaginal bleeding.   Latest Hemoglobin 12.8 on July 2019 - ordered new labs   ETOH overuse none     Creatinine Clearance/Renal Function     Latest ClCr = 56 ml/min on July 2019 - need to monitor renal fxn closely     Drug Interactions   Platelets: 232 in July 2019   ASA/other antiplatelets none   NSAID none   Other drug interactions none   Verified no anticonvulsant or azole therapy, education provided for future use.     Examination   Blood Pressure wnl   Symptomatic hypotension no   Significant gait impairment/imbalance/high risk for falls? no    Final Assessment and Recommendations:   Patient appears stable from the anticoagulation staindpoint.     Benefits of continued DOAC therapy outweigh risks for this patient   Recommend pt continue with current DOAC therapy as long as pt can afford medication.    Pt is willing to pay for Xarelto right now and does not want to transition to warfarin. He understands that he needs to let our clinic know  when he cannot afford it so that we can revisit transitioning to warfarin.      Other Actions: cmp/ cbc hemogram ordered prior to next visit    Follow up:   Will follow up with patient 3 months.      Audra Manzanares, PatriciaD

## 2019-10-22 DIAGNOSIS — I48.0 PAF (PAROXYSMAL ATRIAL FIBRILLATION) (HCC): Chronic | ICD-10-CM

## 2019-10-22 RX ORDER — LISINOPRIL 40 MG/1
40 TABLET ORAL DAILY
Qty: 90 TAB | Refills: 3 | Status: SHIPPED | OUTPATIENT
Start: 2019-10-22 | End: 2020-11-13

## 2019-12-12 DIAGNOSIS — I10 ESSENTIAL HYPERTENSION: Primary | Chronic | ICD-10-CM

## 2020-01-23 ENCOUNTER — TELEPHONE (OUTPATIENT)
Dept: VASCULAR LAB | Facility: MEDICAL CENTER | Age: 80
End: 2020-01-23

## 2020-01-23 NOTE — TELEPHONE ENCOUNTER
Pt no showed his anticoagulation services appointment.  Routed to MA to reschedule  Lina Tolliver, Clinical Pharmacist, CDE, CACP

## 2020-02-29 DIAGNOSIS — I10 ESSENTIAL HYPERTENSION: Chronic | ICD-10-CM

## 2020-03-02 RX ORDER — HYDROCHLOROTHIAZIDE 25 MG/1
TABLET ORAL
Qty: 90 TAB | Refills: 2 | Status: SHIPPED | OUTPATIENT
Start: 2020-03-02 | End: 2020-10-20

## 2020-03-30 ENCOUNTER — ANTICOAGULATION MONITORING (OUTPATIENT)
Dept: VASCULAR LAB | Facility: MEDICAL CENTER | Age: 80
End: 2020-03-30

## 2020-03-30 DIAGNOSIS — I48.0 PAF (PAROXYSMAL ATRIAL FIBRILLATION) (HCC): ICD-10-CM

## 2020-03-30 DIAGNOSIS — Z79.01 CHRONIC ANTICOAGULATION: ICD-10-CM

## 2020-03-30 NOTE — PROGRESS NOTES
Target end date:indfinte     Indication: atrial fibrillation    Drug:Xarelto 20 mg daily       CHADsVASC = 4  Visit conducted via telephone d/t COVID-19, Spoke with pts wife  Health Status Since Last Assessment   Patient denies any new relevant medical problems, ED visits or hospitalizations   Patient denies any embolic events (stroke/tia/systemic embolism)    Adherence with DOAC Therapy   Pt has 0 missed any doses in the average week    Bleeding Risk Assessment     Denies Epistaxis   Pt denies any excessive or unusual bleeding/hematomas.  Pt denies any GI bleeds or hematemesis.  Pt denies any concerning daily headache or sub dural hematoma symptoms.     Pt denies any hematuria or abnormal vaginal bleeding.   Latest Hemoglobin 12.3   ETOH overuse denies     Creatinine Clearance/Renal Function     Latest ClCr 59 ml/min based on actual bodyweight    Hepatic function   Latest LFTs WNL   Pt denies any history of liver dysfunction      Drug Interactions   Platelets: 248   ASA/other antiplatelets none   NSAID none   Other drug interactions non3    Verified no anticonvulsant or azole therapy, education provided for future use.     Examination   Symptomatic hypotension occassional   Significant gait impairment/imbalance/high risk for falls? none    Final Assessment and Recommendations:   Patient appears stable from the anticoagulation staindpoint.     Benefits of continued DOAC therapy outweigh risks for this patient   Recommend pt continue with current DOAC therapy      Other Actions: cmp/ cbc hemogram ordered prior to next visit    Follow up:   Will follow up with patient 6  months.     Mishel Antonio, PatriciaD

## 2020-06-04 PROBLEM — F39 MOOD DISORDER (HCC): Status: ACTIVE | Noted: 2020-06-04

## 2020-06-04 PROBLEM — F43.9 STRESS: Status: ACTIVE | Noted: 2020-06-04

## 2020-06-04 PROBLEM — Z63.8 CAREGIVER ROLE STRAIN: Status: ACTIVE | Noted: 2020-06-04

## 2020-06-04 PROBLEM — F32.A DEPRESSION: Status: ACTIVE | Noted: 2020-06-04

## 2020-06-25 ENCOUNTER — TELEPHONE (OUTPATIENT)
Dept: CARDIOLOGY | Facility: MEDICAL CENTER | Age: 80
End: 2020-06-25

## 2020-07-29 ENCOUNTER — TELEPHONE (OUTPATIENT)
Dept: CARDIOLOGY | Facility: CLINIC | Age: 80
End: 2020-07-29

## 2020-07-29 DIAGNOSIS — I48.0 PAF (PAROXYSMAL ATRIAL FIBRILLATION) (HCC): ICD-10-CM

## 2020-07-30 NOTE — TELEPHONE ENCOUNTER
Sounds reasonable his heart rate has trended lower but I suspect this is not the cause of his concerns.    Selena please call him and have him reduce his metoprolol to half tablets to see if this helps, have him monitor BP and pulse if he is able and get him into [place] next available    Thanks for the heads up

## 2020-07-30 NOTE — TELEPHONE ENCOUNTER
Attempted to call pt at [phone] unable to get through as the line startde making a fax like ring. Attempted to call pt at [phone], unable to LVM as VM box was full.

## 2020-07-30 NOTE — TELEPHONE ENCOUNTER
----- Message from TESSA Jansen sent at 7/29/2020  4:33 PM PDT -----  Regarding: Bradycardia  Dr. العلي,  I had the pleasure of seeing Mr. Alexis Randall in clinic today with concern for a low heart rate of 45, feeling tired, and having increased falls. His EKG today was abnormal including possible first degree AV block and sinus bradycardia. On prior EKGs from 2019 and 2016 he has also had bradycardia. I asked him to call your office to make an appointment as soon as possible as he has not been feeling well and his pulse is so low. Thank you for your time.    Sincerely,  Paige Miller PA-C

## 2020-07-31 ENCOUNTER — OFFICE VISIT (OUTPATIENT)
Dept: CARDIOLOGY | Facility: MEDICAL CENTER | Age: 80
End: 2020-07-31
Payer: MEDICARE

## 2020-07-31 VITALS
SYSTOLIC BLOOD PRESSURE: 134 MMHG | DIASTOLIC BLOOD PRESSURE: 70 MMHG | BODY MASS INDEX: 31.98 KG/M2 | HEART RATE: 48 BPM | WEIGHT: 199 LBS | OXYGEN SATURATION: 95 % | HEIGHT: 66 IN

## 2020-07-31 DIAGNOSIS — Z79.01 CHRONIC ANTICOAGULATION: Chronic | ICD-10-CM

## 2020-07-31 DIAGNOSIS — R00.1 BRADYCARDIA: ICD-10-CM

## 2020-07-31 DIAGNOSIS — M05.20 RHEUMATOID ARTERITIS (HCC): ICD-10-CM

## 2020-07-31 DIAGNOSIS — I48.0 PAF (PAROXYSMAL ATRIAL FIBRILLATION) (HCC): Chronic | ICD-10-CM

## 2020-07-31 DIAGNOSIS — F43.9 STRESS: ICD-10-CM

## 2020-07-31 DIAGNOSIS — I10 ESSENTIAL HYPERTENSION: Chronic | ICD-10-CM

## 2020-07-31 DIAGNOSIS — R09.81 SINUS CONGESTION: ICD-10-CM

## 2020-07-31 DIAGNOSIS — R00.2 PALPITATIONS: ICD-10-CM

## 2020-07-31 DIAGNOSIS — G47.33 OSA ON CPAP: ICD-10-CM

## 2020-07-31 DIAGNOSIS — E11.9 CONTROLLED TYPE 2 DIABETES MELLITUS WITHOUT COMPLICATION, WITHOUT LONG-TERM CURRENT USE OF INSULIN (HCC): ICD-10-CM

## 2020-07-31 DIAGNOSIS — R73.01 ELEVATED FASTING GLUCOSE: ICD-10-CM

## 2020-07-31 DIAGNOSIS — E78.1 HYPERTRIGLYCERIDEMIA: ICD-10-CM

## 2020-07-31 PROCEDURE — 93000 ELECTROCARDIOGRAM COMPLETE: CPT | Performed by: INTERNAL MEDICINE

## 2020-07-31 PROCEDURE — 99214 OFFICE O/P EST MOD 30 MIN: CPT | Performed by: INTERNAL MEDICINE

## 2020-07-31 ASSESSMENT — ENCOUNTER SYMPTOMS
PALPITATIONS: 0
STRIDOR: 0
CHILLS: 0
ORTHOPNEA: 0
GASTROINTESTINAL NEGATIVE: 1
SPUTUM PRODUCTION: 0
RESPIRATORY NEGATIVE: 1
COUGH: 0
MUSCULOSKELETAL NEGATIVE: 1
HEMOPTYSIS: 0
SORE THROAT: 0
FEVER: 0
EYES NEGATIVE: 1
CARDIOVASCULAR NEGATIVE: 1
PND: 0
WHEEZING: 0
SHORTNESS OF BREATH: 0
BRUISES/BLEEDS EASILY: 0
DIZZINESS: 1
LOSS OF CONSCIOUSNESS: 0
WEAKNESS: 1
CLAUDICATION: 0

## 2020-07-31 ASSESSMENT — FIBROSIS 4 INDEX: FIB4 SCORE: 1.22

## 2020-07-31 NOTE — PROGRESS NOTES
Chief Complaint   Patient presents with   • Atrial Fibrillation       Subjective:   Alexis Randall is a 79 y.o. male who presents today in urgent follow-up for history of atrial fibrillation, type 2 diabetes, dyslipidemia.  He has been on metoprolol for some time.  He saw his primary care physician who was concerned about his low resting heart rate.  His metoprolol was cut in half.  Is been having weakness fatigue and dizziness.  He sometimes fall but he describes these as mechanical falls not involving heart rate or dizziness.  He is bradycardic on exam today with a resting heart rate of 48 although he feels asymptomatic.  His blood pressure is checked daily at a program at [facility] where his heart rate has been okay but his blood pressures been between 110 and 130.    Past Medical History:   Diagnosis Date   • Arthritis     rheumatoid   • Chronic anticoagulation    • Depression    • Diabetes ([facility])    • Dyslipidemia    • Essential hypertension    • Hypertension    • Macular degeneration    • PAF (paroxysmal atrial fibrillation) ([facility])      Past Surgical History:   Procedure Laterality Date   • LAMINOTOMY      double discectomy lumbar   • ARTHROPLASTY      bilateral knees   • EYE SURGERY      bilateral cataracts     Family History   Problem Relation Age of Onset   • Heart Disease Mother      Social History     Socioeconomic History   • Marital status:      Spouse name: Not on file   • Number of children: Not on file   • Years of education: Not on file   • Highest education level: Not on file   Occupational History   • Not on file   Social Needs   • Financial resource strain: Not on file   • Food insecurity     Worry: Not on file     Inability: Not on file   • Transportation needs     Medical: Not on file     Non-medical: Not on file   Tobacco Use   • Smoking status: Former Smoker     Years: 24.00     Types: Cigarettes     Last attempt to quit: 1984     Years since quittin.6   •  Smokeless tobacco: Former User   Substance and Sexual Activity   • Alcohol use: Yes     Frequency: Monthly or less     Drinks per session: 1 or 2     Binge frequency: Never   • Drug use: No   • Sexual activity: Not on file   Lifestyle   • Physical activity     Days per week: Not on file     Minutes per session: Not on file   • Stress: Not on file   Relationships   • Social connections     Talks on phone: Not on file     Gets together: Not on file     Attends Caodaism service: Not on file     Active member of club or organization: Not on file     Attends meetings of clubs or organizations: Not on file     Relationship status: Not on file   • Intimate partner violence     Fear of current or ex partner: Not on file     Emotionally abused: Not on file     Physically abused: Not on file     Forced sexual activity: Not on file   Other Topics Concern   • Not on file   Social History Narrative    .    Former .     Allergies   Allergen Reactions   • Remicade [Infliximab Injection] Anaphylaxis     Pt allergy to Remicade, takes pre treatment of Benadryl and SoluMedrol and is able to tolerate infusion.   • Bee Venom Anaphylaxis     Carries epi pen     Outpatient Encounter Medications as of 7/31/2020   Medication Sig Dispense Refill   • escitalopram (LEXAPRO) 20 MG tablet Take 1 Tab by mouth every day. 90 Tab 1   • rivaroxaban (XARELTO) 20 MG Tab tablet Take 1 Tab by mouth with dinner. 90 Tab 1   • hydroCHLOROthiazide (HYDRODIURIL) 25 MG Tab Take 1 tablet by mouth once daily 90 Tab 2   • metFORMIN (GLUCOPHAGE) 500 MG Tab TAKE 1 TABLET BY MOUTH TWICE DAILY WITH MEALS 180 Tab 3   • tamsulosin (FLOMAX) 0.4 MG capsule Take 0.4 mg by mouth every day. Indications: Benign Enlargement of Prostate     • metoprolol (LOPRESSOR) 25 MG Tab Take 1 Tab by mouth 2 times a day. 180 Tab 3   • lisinopril (PRINIVIL, ZESTRIL) 40 MG tablet Take 1 Tab by mouth every day. 90 Tab 3   • atorvastatin (LIPITOR) 20 MG Tab TAKE 1 TABLET  "BY MOUTH ONCE DAILY 90 Tab 3   • tramadol (ULTRAM) 50 MG Tab      • fluticasone (FLONASE) 50 MCG/ACT nasal spray Spray 2 Sprays in nose 2 times a day. 1 Bottle 2   • abatacept (ORENCIA) 250 MG Recon Soln by Intravenous route.     • Omega 3 1000 MG Cap Take 1,000 mg by mouth 2 times a day, with meals. 180 Cap 3   • amLODIPine (NORVASC) 10 MG Tab Take 1 Tab by mouth every day. 90 Tab 3   • albuterol 108 (90 Base) MCG/ACT Aero Soln inhalation aerosol Inhale 2 Puffs by mouth every four hours as needed for Shortness of Breath. 1 Inhaler 1     No facility-administered encounter medications on file as of 7/31/2020.      Review of Systems   Constitutional: Positive for malaise/fatigue. Negative for chills and fever.   HENT: Negative.  Negative for sore throat.    Eyes: Negative.    Respiratory: Negative.  Negative for cough, hemoptysis, sputum production, shortness of breath, wheezing and stridor.    Cardiovascular: Negative.  Negative for chest pain, palpitations, orthopnea, claudication, leg swelling and PND.   Gastrointestinal: Negative.    Genitourinary: Negative.    Musculoskeletal: Negative.    Skin: Negative.    Neurological: Positive for dizziness and weakness. Negative for loss of consciousness.   Endo/Heme/Allergies: Negative.  Does not bruise/bleed easily.   All other systems reviewed and are negative.       Objective:   /70 (BP Location: Left arm, Patient Position: Sitting, BP Cuff Size: Adult)   Pulse (!) 48   Ht 1.676 m (5' 6\")   Wt 90.3 kg (199 lb)   SpO2 95%   BMI 32.12 kg/m²     Physical Exam   Constitutional: He appears well-developed and well-nourished. No distress.   HENT:   Head: Normocephalic and atraumatic.   Right Ear: External ear normal.   Left Ear: External ear normal.   Nose: Nose normal.   Mouth/Throat: No oropharyngeal exudate.   Eyes: Pupils are equal, round, and reactive to light. Conjunctivae and EOM are normal. Right eye exhibits no discharge. Left eye exhibits no discharge. No " scleral icterus.   Neck: Neck supple. No JVD present.   Cardiovascular: Regular rhythm and intact distal pulses. Bradycardia present. Exam reveals no gallop and no friction rub.   No murmur heard.  Pulmonary/Chest: Effort normal. No stridor. No respiratory distress. He has no wheezes. He has no rales. He exhibits no tenderness.   Abdominal: Soft. He exhibits no distension. There is no guarding.   Musculoskeletal: Normal range of motion.         General: No tenderness, deformity or edema.   Neurological: He is alert. He has normal reflexes. He displays normal reflexes. No cranial nerve deficit. He exhibits normal muscle tone. Coordination normal.   Skin: Skin is warm and dry. No rash noted. He is not diaphoretic. No erythema. No pallor.   Psychiatric: He has a normal mood and affect. His behavior is normal. Judgment and thought content normal.   Nursing note and vitals reviewed.      EKG: Dated 7/31/2020 personally interpreted myself showing normal sinus rhythm with sinus bradycardia and a nonspecific interventricular conduction delay.  Lab Results   Component Value Date/Time    CHOLSTRLTOT 129 04/13/2017 09:25 AM    LDL 57 04/13/2017 09:25 AM    HDL 34.0 (L) 04/13/2017 09:25 AM    TRIGLYCERIDE 188 (H) 04/13/2017 09:25 AM       Lab Results   Component Value Date/Time    SODIUM 147 (H) 05/28/2020 03:50 PM    POTASSIUM 4.2 05/28/2020 03:50 PM    CHLORIDE 107 05/28/2020 03:50 PM    CO2 23 05/28/2020 03:50 PM    GLUCOSE 142 (H) 05/28/2020 03:50 PM    BUN 27 (H) 05/28/2020 03:50 PM    CREATININE 1.6 (H) 05/28/2020 03:50 PM     Lab Results   Component Value Date/Time    ALKPHOSPHAT 54 05/28/2020 03:50 PM    ASTSGOT 22 05/28/2020 03:50 PM    ALTSGPT 34 05/28/2020 03:50 PM    TBILIRUBIN 0.4 05/28/2020 03:50 PM          Assessment:     1. Chronic anticoagulation     2. Controlled type 2 diabetes mellitus without complication, without long-term current use of insulin (HCC)     3. Essential hypertension     4. Elevated fasting  glucose     5. Hypertriglyceridemia     6. MARILEE on CPAP     7. PAF (paroxysmal atrial fibrillation) (HCC)     8. Palpitations     9. Rheumatoid arteritis (HCC)     10. Sinus congestion     11. Stress     12. Bradycardia  EKG    EVENT RECORDER CARDIAC       Medical Decision Making:  Today's Assessment / Status / Plan:     79-year-old male with dizziness and sinus bradycardia in the setting of metoprolol for atrial fibrillation.  He is currently approaching sick sinus syndrome.  We will get a event recorder to look for alternative explanations for his dizziness.  He will stay on the half dose of metoprolol.  We will see him back in 6 months but call with the results of the event recorder.

## 2020-08-02 LAB — EKG IMPRESSION: NORMAL

## 2020-08-04 NOTE — TELEPHONE ENCOUNTER
Pt saw RO on 7/31, RO states in his note that pt has cut metoprolol in half. Pt has appt for monitor scheduled on 8/11 in [place].

## 2020-08-11 ENCOUNTER — NON-PROVIDER VISIT (OUTPATIENT)
Dept: CARDIOLOGY | Facility: CLINIC | Age: 80
End: 2020-08-11
Payer: MEDICARE

## 2020-08-11 ENCOUNTER — TELEPHONE (OUTPATIENT)
Dept: CARDIOLOGY | Facility: CLINIC | Age: 80
End: 2020-08-11

## 2020-08-11 DIAGNOSIS — I47.10 SVT (SUPRAVENTRICULAR TACHYCARDIA) (HCC): ICD-10-CM

## 2020-08-11 DIAGNOSIS — I48.0 PAF (PAROXYSMAL ATRIAL FIBRILLATION) (HCC): Chronic | ICD-10-CM

## 2020-09-01 ENCOUNTER — TELEPHONE (OUTPATIENT)
Dept: CARDIOLOGY | Facility: MEDICAL CENTER | Age: 80
End: 2020-09-01

## 2020-09-01 DIAGNOSIS — I48.0 PAF (PAROXYSMAL ATRIAL FIBRILLATION) (HCC): ICD-10-CM

## 2020-09-01 PROCEDURE — 0298T PR EXT ECG > 48HR TO 21 DAY REVIEW AND INTERPRETATN: CPT | Performed by: INTERNAL MEDICINE

## 2020-09-01 PROCEDURE — 0296T PR EXT ECG > 48HR TO 21 DAY RCRD W/CONECT INTL RCRD: CPT | Performed by: INTERNAL MEDICINE

## 2020-09-01 NOTE — TELEPHONE ENCOUNTER
Lauri العلي M.D.  Sanaz Manzo R.N.             The holter test shows that he still has episodes of A. fib the overall rate when he is in the arrhythmia is acceptable and his heart rate when he is in normal rhythm is also acceptable on the metoprolol so if he feels comfortable with his current dosing that is perfectly fine     We could certainly discuss with virtual visit or in person if he wants to switch to an alternative antiarrhythmic, I do not believe this is necessary but we could explore that if he does not want to continue with metoprolol overall I suggest continuing what he is doing now.     please let him know     Thank you          Attempted to call pt at both numbers listed on chart, unable to LVM on either one.

## 2020-09-03 NOTE — TELEPHONE ENCOUNTER
Pt called back. Informed him of results and recommendations. Pt states he has been doing well on metoprolol and feels like this is sufficient at this time. Pt will let us know if any issues arise or if he wants to discuss other medication options.

## 2020-09-19 DIAGNOSIS — E78.5 DYSLIPIDEMIA: Chronic | ICD-10-CM

## 2020-09-19 DIAGNOSIS — I48.0 PAF (PAROXYSMAL ATRIAL FIBRILLATION) (HCC): ICD-10-CM

## 2020-09-19 DIAGNOSIS — Z79.01 CHRONIC ANTICOAGULATION: ICD-10-CM

## 2020-09-22 DIAGNOSIS — Z79.01 CHRONIC ANTICOAGULATION: ICD-10-CM

## 2020-09-22 RX ORDER — RIVAROXABAN 20 MG/1
TABLET, FILM COATED ORAL
Qty: 90 TAB | Refills: 1 | Status: SHIPPED | OUTPATIENT
Start: 2020-09-22 | End: 2021-07-22 | Stop reason: SDUPTHER

## 2020-09-22 RX ORDER — ATORVASTATIN CALCIUM 20 MG/1
TABLET, FILM COATED ORAL
Qty: 90 TAB | Refills: 1 | Status: SHIPPED | OUTPATIENT
Start: 2020-09-22 | End: 2021-06-11

## 2020-11-12 DIAGNOSIS — I48.0 PAF (PAROXYSMAL ATRIAL FIBRILLATION) (HCC): Chronic | ICD-10-CM

## 2020-11-13 RX ORDER — LISINOPRIL 40 MG/1
TABLET ORAL
Qty: 90 TAB | Refills: 2 | Status: SHIPPED | OUTPATIENT
Start: 2020-11-13 | End: 2021-07-22 | Stop reason: SDUPTHER

## 2020-12-23 DIAGNOSIS — I10 ESSENTIAL HYPERTENSION: Chronic | ICD-10-CM

## 2020-12-24 NOTE — TELEPHONE ENCOUNTER
Called and spoke with patient, he states that although he was told to cut his pills in half he has not been since he didn't know how and resting HR is still in 40's. Educated patient about where and how to get a pill cutter, RX sig edited to reflect 0.5 tablets BID and pharmacy asked to cut pills or supply a pill cutter.

## 2021-01-12 DIAGNOSIS — Z23 NEED FOR VACCINATION: ICD-10-CM

## 2021-03-12 DIAGNOSIS — I10 ESSENTIAL HYPERTENSION: Chronic | ICD-10-CM

## 2021-03-14 RX ORDER — HYDROCHLOROTHIAZIDE 25 MG/1
12.5 TABLET ORAL DAILY
Qty: 45 TABLET | Refills: 0 | Status: SHIPPED | OUTPATIENT
Start: 2021-03-14 | End: 2021-05-14

## 2021-06-09 DIAGNOSIS — E78.5 DYSLIPIDEMIA: Chronic | ICD-10-CM

## 2021-06-11 ENCOUNTER — TELEPHONE (OUTPATIENT)
Dept: CARDIOLOGY | Facility: MEDICAL CENTER | Age: 81
End: 2021-06-11

## 2021-06-11 RX ORDER — ATORVASTATIN CALCIUM 20 MG/1
20 TABLET, FILM COATED ORAL
Qty: 90 TABLET | Refills: 0 | Status: SHIPPED | OUTPATIENT
Start: 2021-06-11 | End: 2021-09-20

## 2021-06-11 NOTE — TELEPHONE ENCOUNTER
Went to schedule but PT already scheduled w/ Dr. Castle at [facility].   SLC    ----- Message from Natalia Welsh, Med Ass't sent at 6/11/2021  9:27 AM PDT -----  Regarding: Need Follow Appointment  Please contact patient to schedule follow up appointment. Thank you

## 2021-06-14 ENCOUNTER — OFFICE VISIT (OUTPATIENT)
Dept: CARDIOLOGY | Facility: CLINIC | Age: 81
End: 2021-06-14
Payer: MEDICARE

## 2021-06-14 VITALS
HEIGHT: 66 IN | OXYGEN SATURATION: 97 % | SYSTOLIC BLOOD PRESSURE: 140 MMHG | HEART RATE: 60 BPM | BODY MASS INDEX: 31.5 KG/M2 | WEIGHT: 196 LBS | DIASTOLIC BLOOD PRESSURE: 70 MMHG

## 2021-06-14 DIAGNOSIS — E78.2 MIXED HYPERLIPIDEMIA: ICD-10-CM

## 2021-06-14 DIAGNOSIS — I10 ESSENTIAL HYPERTENSION: ICD-10-CM

## 2021-06-14 DIAGNOSIS — I48.0 PAF (PAROXYSMAL ATRIAL FIBRILLATION) (HCC): Chronic | ICD-10-CM

## 2021-06-14 PROCEDURE — 99214 OFFICE O/P EST MOD 30 MIN: CPT | Performed by: INTERNAL MEDICINE

## 2021-06-14 RX ORDER — ANTIOX #8/OM3/DHA/EPA/LUT/ZEAX 250-2.5 MG
CAPSULE ORAL
COMMUNITY

## 2021-06-14 ASSESSMENT — FIBROSIS 4 INDEX: FIB4 SCORE: 1.3

## 2021-06-14 NOTE — Clinical Note
Lenin Dominguez,   Can you please call the patient tomorrow and confirm his antihypertensives?  Per his chart, patient is supposed to be on 4 medications however he does not recall if he is on amlodipine.    Thank you  AA

## 2021-06-14 NOTE — LETTER
[facility] [address],   [address]nd [place] [address]  Phone: [phone]  Fax: [phone]              Alexis Randall  1940    Encounter Date: 2021    Kari Castle M.D.          PROGRESS NOTE:  Chief Complaint   Patient presents with   • Palpitations   • Hypertension       Subjective:   Alexis Randall is a 80 y.o. male who presents today for follow-up on hypertension.    Patient goes to [facility] and every morning his blood pressure has been in the 160s to 190s systolic.  Usually in the afternoon his blood pressures in the 140s systolic.    He reports compliance with his medications however does not recall if he is taking amlodipine.  He is otherwise asymptomatic.    Past Medical History:   Diagnosis Date   • Arthritis     rheumatoid   • Chronic anticoagulation    • Depression    • Diabetes (HCC)    • Dyslipidemia    • Essential hypertension    • Hypertension    • Macular degeneration    • PAF (paroxysmal atrial fibrillation) ([facility])      Past Surgical History:   Procedure Laterality Date   • LAMINOTOMY      double discectomy lumbar   • ARTHROPLASTY      bilateral knees   • EYE SURGERY      bilateral cataracts     Family History   Problem Relation Age of Onset   • Heart Disease Mother      Social History     Socioeconomic History   • Marital status:      Spouse name: Not on file   • Number of children: Not on file   • Years of education: Not on file   • Highest education level: Not on file   Occupational History   • Not on file   Tobacco Use   • Smoking status: Former Smoker     Years: 24.00     Types: Cigarettes     Quit date: 1984     Years since quittin.4   • Smokeless tobacco: Former User   Vaping Use   • Vaping Use: Never used   Substance and Sexual Activity   • Alcohol use: Yes   • Drug use: No   • Sexual activity: Not on file   Other Topics Concern   • Not on file   Social History Narrative     .    Former .     Social Determinants of Health     Financial Resource Strain:    • Difficulty of Paying Living Expenses:    Food Insecurity:    • Worried About Running Out of Food in the Last Year:    • Ran Out of Food in the Last Year:    Transportation Needs:    • Lack of Transportation (Medical):    • Lack of Transportation (Non-Medical):    Physical Activity:    • Days of Exercise per Week:    • Minutes of Exercise per Session:    Stress:    • Feeling of Stress :    Social Connections:    • Frequency of Communication with Friends and Family:    • Frequency of Social Gatherings with Friends and Family:    • Attends Sikhism Services:    • Active Member of Clubs or Organizations:    • Attends Club or Organization Meetings:    • Marital Status:    Intimate Partner Violence:    • Fear of Current or Ex-Partner:    • Emotionally Abused:    • Physically Abused:    • Sexually Abused:      Allergies   Allergen Reactions   • Remicade [Infliximab Injection] Anaphylaxis     Pt allergy to Remicade, takes pre treatment of Benadryl and SoluMedrol and is able to tolerate infusion.   • Bee Venom Anaphylaxis     Carries epi pen     Outpatient Encounter Medications as of 6/14/2021   Medication Sig Dispense Refill   • Multiple Vitamins-Minerals (PRESERVISION AREDS 2) Cap Take  by mouth.     • atorvastatin (LIPITOR) 20 MG Tab Take 1 tablet by mouth at bedtime. Please call [phone] to schedule follow up appointment, thank you. 90 tablet 0   • hydroCHLOROthiazide (HYDRODIURIL) 25 MG Tab TAKE 1/2 (ONE-HALF) TABLET BY MOUTH ONCE DAILY . APPOINTMENT REQUIRED FOR FUTURE REFILLS 15 tablet 0   • metFORMIN (GLUCOPHAGE) 500 MG Tab TAKE 1 TABLET BY MOUTH TWICE DAILY WITH MEALS 180 tablet 1   • escitalopram (LEXAPRO) 20 MG tablet Take 1 tablet by mouth once daily 90 Tab 1   • metoprolol (LOPRESSOR) 25 MG Tab Take 0.5 Tabs by mouth 2 times a day. 180 Tab 1   • lisinopril (PRINIVIL) 40 MG tablet Take 1 tablet by mouth  "once daily 90 Tab 2   • XARELTO 20 MG Tab tablet TAKE 1 TABLET BY MOUTH WITH SUPPER 90 Tab 1   • tramadol (ULTRAM) 50 MG Tab Take 50 mg by mouth every 6 hours as needed.     • abatacept (ORENCIA) 250 MG Recon Soln Infuse  into a venous catheter.     • Omega 3 1000 MG Cap Take 1,000 mg by mouth 2 times a day, with meals. 180 Cap 3   • amLODIPine (NORVASC) 10 MG Tab Take 1 Tab by mouth every day. 90 Tab 3   • albuterol 108 (90 Base) MCG/ACT Aero Soln inhalation aerosol Inhale 2 Puffs by mouth every four hours as needed for Shortness of Breath. 1 Inhaler 1   • [DISCONTINUED] Saw Palmetto, Serenoa repens, (SAW PALMETTO PO) Take 1 capsule by mouth 2 Times a Day. (Patient not taking: Reported on 6/14/2021)     • tamsulosin (FLOMAX) 0.4 MG capsule Take 0.4 mg by mouth every day. Indications: Benign Enlargement of Prostate     • [DISCONTINUED] fluticasone (FLONASE) 50 MCG/ACT nasal spray Spray 2 Sprays in nose 2 times a day. (Patient not taking: Reported on 6/14/2021) 1 Bottle 2     No facility-administered encounter medications on file as of 6/14/2021.     Review of Systems   Constitutional: Negative for malaise/fatigue.   Respiratory: Negative for shortness of breath.    Cardiovascular: Negative for chest pain, palpitations, orthopnea, leg swelling and PND.   Gastrointestinal: Negative for abdominal pain.   Musculoskeletal: Negative for falls.   Neurological: Negative for dizziness and loss of consciousness.   Psychiatric/Behavioral: Negative for depression.   All other systems reviewed and are negative.       Objective:   /70 (BP Location: Left arm, Patient Position: Sitting, BP Cuff Size: Adult)   Pulse 60   Ht 1.676 m (5' 6\")   Wt 88.9 kg (196 lb)   SpO2 97%   BMI 31.64 kg/m²     Physical Exam   Constitutional: He is oriented to person, place, and time. He appears well-developed. No distress.   HENT:   Head: Normocephalic and atraumatic.   Eyes: Conjunctivae are normal. No scleral icterus.   Cardiovascular: " Normal rate, regular rhythm and normal heart sounds. Exam reveals no gallop and no friction rub.   No murmur heard.  Pulmonary/Chest: Effort normal and breath sounds normal. No respiratory distress. He has no wheezes. He has no rales.   Musculoskeletal:         General: No swelling.      Cervical back: Normal range of motion and neck supple.   Neurological: He is alert and oriented to person, place, and time.   Skin: Skin is warm and dry. He is not diaphoretic.   Psychiatric: His behavior is normal. Judgment and thought content normal.   Nursing note and vitals reviewed.      Assessment:     1. PAF (paroxysmal atrial fibrillation) (HCC)     2. Essential hypertension     3. Mixed hyperlipidemia         Medical Decision Making:  Today's Assessment / Status / Plan:     Patient reports having uncontrolled hypertension however blood pressure today is borderline elevated.  He is not sure if he is taking his amlodipine.  I have requested LESIA Hart to contact the patient and confirm his medications.  If he is in fact taking his antihypertensives, we will consider increasing his medication doses versus restarting his current regimen.  Continue lisinopril, hydrochlorothiazide and metoprolol at current dose for now.    For hyperlipidemia continue Lipitor at current dose.    For atrial fibrillation, patient has been asymptomatic.  Continue Xarelto.    Return to clinic in 3 months with Dr. العلي, 1 month with MARCO Galindo or earlier if needed.    Thank you for allowing me to participate in the care of this patient. Please do not hesitate to contact me with any questions.    Kari Castle MD, [facility]  Cardiologist  [facility]    PLEASE NOTE: This dictation was created using voice recognition software.             Silvana Stein A.P.R.N.  [address]  [place] [address]  Via In Basket

## 2021-06-15 ASSESSMENT — ENCOUNTER SYMPTOMS
FALLS: 0
DEPRESSION: 0
ABDOMINAL PAIN: 0
PND: 0
PALPITATIONS: 0
SHORTNESS OF BREATH: 0
ORTHOPNEA: 0
DIZZINESS: 0
LOSS OF CONSCIOUSNESS: 0

## 2021-06-17 ENCOUNTER — TELEPHONE (OUTPATIENT)
Dept: CARDIOLOGY | Facility: MEDICAL CENTER | Age: 81
End: 2021-06-17

## 2021-06-17 NOTE — TELEPHONE ENCOUNTER
----- Message from Kari Castle M.D. sent at 6/14/2021  2:17 PM PDT -----    Can you please call the patient tomorrow and confirm his antihypertensives?  Per his chart, patient is supposed to be on 4 medications however he does not recall if he is on amlodipine.

## 2021-06-18 RX ORDER — AMLODIPINE BESYLATE 2.5 MG/1
2.5 TABLET ORAL DAILY
Qty: 90 TABLET | Refills: 3 | Status: SHIPPED | OUTPATIENT
Start: 2021-06-18 | End: 2022-02-10 | Stop reason: SDUPTHER

## 2021-07-22 ENCOUNTER — TELEPHONE (OUTPATIENT)
Dept: CARDIOLOGY | Facility: MEDICAL CENTER | Age: 81
End: 2021-07-22

## 2021-07-22 DIAGNOSIS — I10 ESSENTIAL HYPERTENSION: Chronic | ICD-10-CM

## 2021-07-22 DIAGNOSIS — Z79.01 CHRONIC ANTICOAGULATION: ICD-10-CM

## 2021-07-22 DIAGNOSIS — I48.0 PAF (PAROXYSMAL ATRIAL FIBRILLATION) (HCC): ICD-10-CM

## 2021-07-22 RX ORDER — HYDROCHLOROTHIAZIDE 12.5 MG/1
12.5 TABLET ORAL DAILY
Qty: 90 TABLET | Refills: 3 | Status: SHIPPED | OUTPATIENT
Start: 2021-07-22 | End: 2022-05-09 | Stop reason: SDUPTHER

## 2021-07-22 RX ORDER — LISINOPRIL 40 MG/1
40 TABLET ORAL EVERY EVENING
Qty: 90 TABLET | Refills: 3 | Status: SHIPPED | OUTPATIENT
Start: 2021-07-22 | End: 2022-08-03 | Stop reason: SDUPTHER

## 2021-07-22 NOTE — TELEPHONE ENCOUNTER
Patients wife Breanne calls into the operators to report a BP of 200/96 this am when he goes into [facility] for his daily check.  It was done 1/2 hour after his am meds.  She takes it for him now and it is 140/60 HR 48.    Patient has not been taking HCTZ (he ran out and we would not refill until he had an appt).  He has been taking Metoprolol incorrectly (25 BID) which may be the cause of his low HR.  I spoke to her at length and advised him to take the 12.5 mg of Metoprolol BID; add the HCTZ back (AM); take the Amlodipine 2.5 (AM), and take the Lisinopril 40 mg in the pm.      He has an appt next week with AB and wife agrees to get HR and BP daily (in mid am or mid pm) and bring those readings to the appt.  She also will be getting his pills ready for him every day.    I mailed him a letter with the current meds. He will bring this with him if there are any changes in the list.

## 2021-09-18 DIAGNOSIS — E78.5 DYSLIPIDEMIA: Chronic | ICD-10-CM

## 2021-09-20 RX ORDER — ATORVASTATIN CALCIUM 20 MG/1
TABLET, FILM COATED ORAL
Qty: 90 TABLET | Refills: 0 | Status: SHIPPED | OUTPATIENT
Start: 2021-09-20 | End: 2022-01-03

## 2021-10-01 ENCOUNTER — TELEPHONE (OUTPATIENT)
Dept: HEALTH INFORMATION MANAGEMENT | Facility: OTHER | Age: 81
End: 2021-10-01

## 2021-10-01 NOTE — TELEPHONE ENCOUNTER
Outcome: Left Message to scheduled annual with pcp    Please transfer to Patient Outreach Team at [phone] when patient returns call.        Attempt #1

## 2021-10-08 NOTE — TELEPHONE ENCOUNTER
Outcome: Left Message    Please transfer to Patient Outreach Team at [phone] when patient returns call.        Attempt # 2

## 2021-11-16 ENCOUNTER — APPOINTMENT (OUTPATIENT)
Dept: CARDIOLOGY | Facility: CLINIC | Age: 81
End: 2021-11-16
Payer: MEDICARE

## 2022-01-02 DIAGNOSIS — E78.5 DYSLIPIDEMIA: Chronic | ICD-10-CM

## 2022-01-06 RX ORDER — ATORVASTATIN CALCIUM 20 MG/1
TABLET, FILM COATED ORAL
Qty: 90 TABLET | Refills: 0 | Status: SHIPPED | OUTPATIENT
Start: 2022-01-06 | End: 2022-03-31 | Stop reason: SDUPTHER

## 2022-01-06 NOTE — TELEPHONE ENCOUNTER
Pts previous cardiologist, Dr. Castle, is no longer with [facility].    Contacted pt and s/w him and his wife. Assisted pt in scheduling follow up with Dr. العلي 2/10 (has seen this provider in the past) and refilled statin.

## 2022-02-10 ENCOUNTER — OFFICE VISIT (OUTPATIENT)
Dept: CARDIOLOGY | Facility: MEDICAL CENTER | Age: 82
End: 2022-02-10
Payer: MEDICARE

## 2022-02-10 VITALS
BODY MASS INDEX: 32.95 KG/M2 | OXYGEN SATURATION: 96 % | RESPIRATION RATE: 20 BRPM | SYSTOLIC BLOOD PRESSURE: 142 MMHG | HEART RATE: 51 BPM | WEIGHT: 205 LBS | DIASTOLIC BLOOD PRESSURE: 70 MMHG | HEIGHT: 66 IN

## 2022-02-10 DIAGNOSIS — Z79.01 CHRONIC ANTICOAGULATION: Chronic | ICD-10-CM

## 2022-02-10 DIAGNOSIS — E78.5 DYSLIPIDEMIA: Chronic | ICD-10-CM

## 2022-02-10 DIAGNOSIS — E78.1 HYPERTRIGLYCERIDEMIA: ICD-10-CM

## 2022-02-10 DIAGNOSIS — I10 ESSENTIAL HYPERTENSION: Chronic | ICD-10-CM

## 2022-02-10 DIAGNOSIS — I48.0 PAF (PAROXYSMAL ATRIAL FIBRILLATION) (HCC): Chronic | ICD-10-CM

## 2022-02-10 DIAGNOSIS — R00.1 BRADYCARDIA: ICD-10-CM

## 2022-02-10 PROCEDURE — 99214 OFFICE O/P EST MOD 30 MIN: CPT | Performed by: INTERNAL MEDICINE

## 2022-02-10 RX ORDER — TAMSULOSIN HYDROCHLORIDE 0.4 MG/1
0.4 CAPSULE ORAL
COMMUNITY
End: 2022-02-10

## 2022-02-10 RX ORDER — ESCITALOPRAM OXALATE 20 MG/1
1 TABLET ORAL DAILY
COMMUNITY
Start: 2021-10-04 | End: 2022-02-10

## 2022-02-10 RX ORDER — AMLODIPINE BESYLATE 5 MG/1
5 TABLET ORAL DAILY
Qty: 90 TABLET | Refills: 3 | Status: SHIPPED | OUTPATIENT
Start: 2022-02-10 | End: 2023-01-30

## 2022-02-10 RX ORDER — METHYLPREDNISOLONE 4 MG/1
TABLET ORAL DAILY
Status: ON HOLD | COMMUNITY
Start: 2022-02-09 | End: 2022-11-19

## 2022-02-10 RX ORDER — AZITHROMYCIN 250 MG/1
TABLET, FILM COATED ORAL
COMMUNITY
Start: 2021-12-26 | End: 2022-02-10

## 2022-02-10 RX ORDER — CELECOXIB 200 MG/1
200 CAPSULE ORAL 2 TIMES DAILY
COMMUNITY
Start: 2022-01-23

## 2022-02-10 RX ORDER — METHYLPREDNISOLONE 4 MG/1
TABLET ORAL
COMMUNITY
Start: 2021-11-02 | End: 2022-02-10

## 2022-02-10 RX ORDER — CELECOXIB 200 MG/1
CAPSULE ORAL
COMMUNITY
Start: 2021-11-16 | End: 2022-02-10

## 2022-02-10 ASSESSMENT — FIBROSIS 4 INDEX: FIB4 SCORE: 1.32

## 2022-02-10 NOTE — PROGRESS NOTES
Chief Complaint   Patient presents with   • Anticoagulation   • Atrial Fibrillation     F/V Dx: PAF (paroxysmal atrial fibrillation) ([facility])       Subjective     Alexis Randall is a 81 y.o. male who presents today for follow-up of his history of paroxysmal atrial fibrillation    Been doing over the past 2 years since I last saw him he did see us last , he was seen for bronchitis in December his Covid testing was negative    He has had intermittent high blood pressures mostly due to medication adherence    xarelto is too expensive    Had short episodes of afib including yesterday    Past Medical History:   Diagnosis Date   • Arthritis     rheumatoid   • Chronic anticoagulation    • Depression    • Diabetes (HCC)    • Dyslipidemia    • Essential hypertension    • Hypertension    • Macular degeneration    • PAF (paroxysmal atrial fibrillation) ([facility])      Past Surgical History:   Procedure Laterality Date   • LAMINOTOMY      double discectomy lumbar   • ARTHROPLASTY      bilateral knees   • EYE SURGERY      bilateral cataracts     Family History   Problem Relation Age of Onset   • Heart Disease Mother      Social History     Socioeconomic History   • Marital status:      Spouse name: Not on file   • Number of children: Not on file   • Years of education: Not on file   • Highest education level: Not on file   Occupational History   • Not on file   Tobacco Use   • Smoking status: Former Smoker     Years: 24.00     Types: Cigarettes     Quit date: 1984     Years since quittin.1   • Smokeless tobacco: Former User   Vaping Use   • Vaping Use: Never used   Substance and Sexual Activity   • Alcohol use: Yes     Comment: twice-three times a month   • Drug use: No   • Sexual activity: Not on file   Other Topics Concern   • Not on file   Social History Narrative    .    Former .     Social Determinants of Health     Financial Resource Strain:    • Difficulty of Paying Living Expenses: Not  on file   Food Insecurity:    • Worried About Running Out of Food in the Last Year: Not on file   • Ran Out of Food in the Last Year: Not on file   Transportation Needs:    • Lack of Transportation (Medical): Not on file   • Lack of Transportation (Non-Medical): Not on file   Physical Activity:    • Days of Exercise per Week: Not on file   • Minutes of Exercise per Session: Not on file   Stress:    • Feeling of Stress : Not on file   Social Connections:    • Frequency of Communication with Friends and Family: Not on file   • Frequency of Social Gatherings with Friends and Family: Not on file   • Attends Holiness Services: Not on file   • Active Member of Clubs or Organizations: Not on file   • Attends Club or Organization Meetings: Not on file   • Marital Status: Not on file   Intimate Partner Violence:    • Fear of Current or Ex-Partner: Not on file   • Emotionally Abused: Not on file   • Physically Abused: Not on file   • Sexually Abused: Not on file   Housing Stability:    • Unable to Pay for Housing in the Last Year: Not on file   • Number of Places Lived in the Last Year: Not on file   • Unstable Housing in the Last Year: Not on file     Allergies   Allergen Reactions   • Remicade [Infliximab Injection] Anaphylaxis     Pt allergy to Remicade, takes pre treatment of Benadryl and SoluMedrol and is able to tolerate infusion.   • Bee Venom Anaphylaxis     Carries epi pen     Outpatient Encounter Medications as of 2/10/2022   Medication Sig Dispense Refill   • methylPREDNISolone (MEDROL) 4 MG Tab Take  by mouth every day. 1/2 tab daily     • celecoxib (CELEBREX) 200 MG Cap Take 200 mg by mouth 2 times a day.     • metFORMIN (GLUCOPHAGE) 500 MG Tab Take 1 Tablet by mouth 2 times a day with meals. 60 Tablet 0   • atorvastatin (LIPITOR) 20 MG Tab TAKE 1 TABLET BY MOUTH ONCE DAILY AT BEDTIME . APPOINTMENT REQUIRED FOR FUTURE REFILLS (Patient taking differently: Take 20 mg by mouth every day.) 90 Tablet 0   •  escitalopram (LEXAPRO) 20 MG tablet TAKE 1 TABLET BY MOUTH ONCE DAILY . APPOINTMENT REQUIRED FOR FUTURE REFILLS 90 Tablet 1   • metoprolol tartrate (LOPRESSOR) 25 MG Tab Take 0.5 Tablets by mouth 2 times a day. 180 tablet 3   • hydroCHLOROthiazide (HYDRODIURIL) 12.5 MG tablet Take 1 tablet by mouth every day. 90 tablet 3   • lisinopril (PRINIVIL) 40 MG tablet Take 1 tablet by mouth every evening. 90 tablet 3   • amLODIPine (NORVASC) 2.5 MG Tab Take 1 tablet by mouth every day. 90 tablet 3   • Multiple Vitamins-Minerals (PRESERVISION AREDS 2) Cap Take  by mouth.     • tramadol (ULTRAM) 50 MG Tab Take 50 mg by mouth every 6 hours as needed.     • abatacept (ORENCIA) 250 MG Recon Soln Infuse  into a venous catheter.     • Omega 3 1000 MG Cap Take 1,000 mg by mouth 2 times a day, with meals. 180 Cap 3   • albuterol 108 (90 Base) MCG/ACT Aero Soln inhalation aerosol Inhale 2 Puffs by mouth every four hours as needed for Shortness of Breath. 1 Inhaler 1   • [DISCONTINUED] tamsulosin (FLOMAX) 0.4 MG capsule Take 0.4 mg by mouth. (Patient not taking: Reported on 2/10/2022)     • [DISCONTINUED] metFORMIN (GLUCOPHAGE) 500 MG Tab Take 1 Tablet by mouth 2 times a day with meals. (Patient not taking: Reported on 2/10/2022)     • [DISCONTINUED] escitalopram (LEXAPRO) 20 MG tablet Take 1 Tablet by mouth every day. (Patient not taking: Reported on 2/10/2022)     • [DISCONTINUED] methylPREDNISolone (MEDROL) 4 MG Tab  (Patient not taking: Reported on 2/10/2022)     • [DISCONTINUED] celecoxib (CELEBREX) 200 MG Cap  (Patient not taking: Reported on 2/10/2022)     • [DISCONTINUED] azithromycin (ZITHROMAX) 250 MG Tab TAKE 2 TABLETS BY MOUTH ON DAY 1, AND THEN TAKE 1 TABLET BY MOUTH ONCE A DAY ON DAY 2 THROUGH DAY 5 (Patient not taking: Reported on 2/10/2022)     • rivaroxaban (XARELTO) 20 MG Tab tablet Take 1 tablet by mouth with dinner. (Patient not taking: Reported on 2/10/2022) 90 tablet 3   • tamsulosin (FLOMAX) 0.4 MG capsule Take  "0.4 mg by mouth every day. Indications: Benign Enlargement of Prostate (Patient not taking: Reported on 2/10/2022)       No facility-administered encounter medications on file as of 2/10/2022.     ROS           Objective     /70 (BP Location: Left arm, Patient Position: Sitting, BP Cuff Size: Adult)   Pulse (!) 51   Resp 20   Ht 1.676 m (5' 6\")   Wt 93 kg (205 lb)   SpO2 96%   BMI 33.09 kg/m²     Physical Exam  Constitutional:       General: He is not in acute distress.     Appearance: He is not diaphoretic.   Eyes:      General: No scleral icterus.  Neck:      Vascular: No JVD.   Cardiovascular:      Rate and Rhythm: Normal rate.      Heart sounds: Normal heart sounds. No murmur heard.  No friction rub. No gallop.    Pulmonary:      Effort: No respiratory distress.      Breath sounds: No wheezing or rales.   Abdominal:      General: Bowel sounds are normal.      Palpations: Abdomen is soft.   Skin:     Findings: No rash.   Neurological:      Mental Status: He is alert.            We reviewed in person the most recent labs  Recent Results (from the past 5040 hour(s))   CoV-2, Flu A/B, And RSV by PCR ([facility])    Collection Time: 12/23/21  6:10 PM    Specimen: Nasopharyngeal; Respirate   Result Value Ref Range    Influenza virus A RNA Negative Negative    Influenza virus B, PCR Negative Negative    RSV, PCR Not Detected Not Detected    SARS-CoV-2 by PCR Negative Negative    SARS-CoV-2 Source NP    RAPID BETA STREP GROUP A    Collection Time: 12/23/21  6:10 PM    Specimen: Throat; Respirate   Result Value Ref Range    Rapid Strep Screen Negative Negative   POCT COVID Antigen    Collection Time: 01/12/22 10:21 AM   Result Value Ref Range    POC SARS-COV Antigen TOSIN Negative        EKG in November 2020 was A. Fib    Assessment & Plan     1. Bradycardia     2. Chronic anticoagulation     3. Dyslipidemia     4. Hypertriglyceridemia     5. Essential hypertension     6. PAF (paroxysmal atrial fibrillation) (HCC) "         Medical Decision Making: Today's Assessment/Status/Plan:        It was my pleasure to meet with Mr. Randall.    We addressed the management of hypertension at today's visit. Blood pressure is not well controlled.  We specifically assessed the labs on hypertension treatment  Increased amlodipine to 5 mg  We addressed the management of dyslipidemia at today's visit. He is on appropriate statin.    We addressed the management of chronic anticoagulation at today's visit. He understands the risks and benefits of chronic anticoagulation.  We reviewed and verified the results of annual testing for anemia and kidney function. Would prefer to switch to warfarin given cost    For recurrent afib can add amiodarone in the future    I will see Mr. Randall back in 1 year time and encouraged him to follow up with us over the phone or electronically using my MyChart as issues arise.    It is my pleasure to participate in the care of Mr. Randall.  Please do not hesitate to contact me with questions or concerns.    Lauri العلي MD PhD [facility]  Cardiologist [facility]    Please note that this dictation was created using voice recognition software. There may be errors I did not discover before finalizing the note.

## 2022-02-10 NOTE — PATIENT INSTRUCTIONS
High vitamin K foods   [place] sprouts, fresh (cooked, drained) 1/2 cup 110   [place] sprouts, frozen (cooked, drained) 1/2 cup 150   Greens, beet, fresh (cooked, drained) 1/2 cup 350   Greens, heraclio, frozen (cooked, drained) 1/2 cup 530   Greens, heraclio, fresh (cooked, drained) 1/2 cup 365   Greens, mustard, fresh (cooked, drained) 1/2 cup 415   Greens, turnip, fresh (cooked, drained) 1/2 cup 265   Greens, turnip, frozen (cooked, drained) 1/2 cup 425   Kale, fresh (cooked, drained) 1/2 cup 530   Kale, frozen (cooked, drained) 1/2 cup 565   Spinach, fresh (cooked, drained) 1/2 cup 444   Spinach, frozen (cooked, drained) 1/2 cup 514   Spinach, fresh (raw) 1 cup 150   Medium vitamin K foods   Asparagus, frozen (cooked, drained) 1/2 cup 72    4 rowland 48   Asparagus, fresh (cooked, drained) 4 rowland 30   Beans, green or yellow, fresh (cooked, drained) 1/2 cup 10   Broccoli, fresh (cooked, drained) 1 spear 26   Broccoli, frozen (cooked, drained) 1/2 cup 80   Broccoli, raw 1/2 cup 45   Cabbage (cooked, drained) 1/2 cup 80   Cabbage, Chinese bok micaela (cooked, drained) 1/2 cup 28   Cabbage, green (raw) 1/2 cup 26   Cabbage, red (raw) 1/2 cup 14   Cabbage, Giselle (raw) 1/2 cup 24   Calcium soft chews (brand name Viactiv with D) see note below about other vitamin pills 1 chew 40   Carrots, fresh or frozen (cooked, drained) 1/2 cup 10   Cauliflower, fresh or frozen (raw or cooked, drained) 1/2 cup 10   Celery, raw 1/2 cup  17   Coleslaw (fast food-type) 1/2 cup 37   Endive 1/2 cup 60   Lettuce (butterhead, Sunburst, Chava) 1/2 head 80   Lettuce (iceberg, crisphead) 1/2 head 65   Lettuce (vinny, cos) 1 cup 57   Lettuce (green leaf) 1 cup 97   Oil, canola 1 tablespoon 17   Okra, fresh (cooked, drained) 1/2 cup 32   Okra, frozen (cooked, drained) 1/2 cup 44   Peas, frozen, with pod (cooked, drained) 1/2 cup 24   Peas, fresh, with pod (cooked, drained) 1/2 cup 20   Peas, green, frozen (cooked, drained) 1/2 cup 18   Pickle  relish, sweet 1 tablespoon 13   Pickles, cucumber dill or kosher dill 1 pickle 25   Sauerkraut, canned 1/2 cup 56   Vegetables, mixed frozen or canned (cooked, drained) 1/2 cup 20   Low vitamin K foods   Avocado 1 ounce All of these foods have less than 10 micrograms of vitamin K per serving   Bananas 1 banana    Chickpeas (garbanzo beans) 1/2 cup    Corn 1/2 cup    Fruit (fresh, frozen or canned including apples, nectarines, peaches, watermelon) Whole piece of fresh fruit, 1 wedge of watermelon, or 1/2 cup    Mayonnaise 1 tablespoon    Oil, olive 1 tablespoon    Oil, other (including peanut, sesame, safflower, corn, sunflower, soybean) 1 tablespoon    Peppers, green or red 1/2 pepper or 1/2 cup    Potatoes 1 potato or 1/2 cup    Seaweed, kelp (raw) 1 tablespoon    Tomatoes 1 tomato or 1/2 cup    Foods very low in vitamin K, but have been occasionally implicated in warfarin or other drug interactions   Cranberry juice 4 ounces juice We suggest limiting the amount of these to one or two servings per day   Grapefruit 4 ounces juice     1/2 grapefruit    Green tea, brewed 4 ounces

## 2022-02-11 ENCOUNTER — TELEPHONE (OUTPATIENT)
Dept: VASCULAR LAB | Facility: MEDICAL CENTER | Age: 82
End: 2022-02-11

## 2022-02-11 ENCOUNTER — TELEPHONE (OUTPATIENT)
Dept: CARDIOLOGY | Facility: MEDICAL CENTER | Age: 82
End: 2022-02-11

## 2022-02-11 DIAGNOSIS — I48.0 PAF (PAROXYSMAL ATRIAL FIBRILLATION) (HCC): ICD-10-CM

## 2022-02-11 DIAGNOSIS — Z79.01 CHRONIC ANTICOAGULATION: ICD-10-CM

## 2022-02-11 RX ORDER — WARFARIN SODIUM 5 MG/1
5 TABLET ORAL DAILY
Qty: 30 TABLET | Refills: 3 | Status: SHIPPED | OUTPATIENT
Start: 2022-02-11 | End: 2022-08-24

## 2022-02-11 NOTE — TELEPHONE ENCOUNTER
[facility] and Pharmacotherapy Programs    Received anticoag referral for warfarin due to AF from Dr. العلي on 2/10/22    Scheduled pt for initial visit on 2/16.     Sent in Rx for warfarin 5 mg for pt to start on 2/13.    Insurance: [facility]  PCP: Non-[facility]  Locations to be seen: [place]    [facility]ticoagulation/Pharmacotherapy Clinic at [phone], fax [phone]    Rohith Goodman, PharmD, BCACP

## 2022-02-11 NOTE — TELEPHONE ENCOUNTER
SKYLER Nichole from [facility] has a question on script:     warfarin (COUMADIN) 5 MG Tab (Order #[id number]) on 2/11/22    Dionisio - [phone]    Thank you,   Sridevi RENE

## 2022-02-12 NOTE — TELEPHONE ENCOUNTER
Pt's pharmacy called stating pt thinks he has an ulcer. He is currently on celecoxib. Give imminent initiation of OAC, advised pt go to UC/ED for further workup to r/o ulcer prior to starting warfarin to decrease risk of GIB (no current s/sx noted).    Rohith Goodman, PharmD, BCACP

## 2022-02-23 ENCOUNTER — ANTICOAGULATION VISIT (OUTPATIENT)
Dept: CARDIOLOGY | Facility: PHYSICIAN GROUP | Age: 82
End: 2022-02-23
Payer: MEDICARE

## 2022-02-23 ENCOUNTER — DOCUMENTATION (OUTPATIENT)
Dept: VASCULAR LAB | Facility: MEDICAL CENTER | Age: 82
End: 2022-02-23

## 2022-02-23 DIAGNOSIS — I48.0 PAF (PAROXYSMAL ATRIAL FIBRILLATION) (HCC): ICD-10-CM

## 2022-02-23 DIAGNOSIS — Z79.01 CHRONIC ANTICOAGULATION: ICD-10-CM

## 2022-02-23 LAB — INR PPP: 1 (ref 2–3.5)

## 2022-02-23 PROCEDURE — 93793 ANTICOAG MGMT PT WARFARIN: CPT | Performed by: INTERNAL MEDICINE

## 2022-02-23 PROCEDURE — 85610 PROTHROMBIN TIME: CPT | Mod: QW | Performed by: INTERNAL MEDICINE

## 2022-02-23 ASSESSMENT — CHA2DS2 SCORE
AGE 65 TO 74: NO
DIABETES: NO
HYPERTENSION: YES
SEX: MALE
CHF OR LEFT VENTRICULAR DYSFUNCTION: NO
VASCULAR DISEASE: NO
AGE 75 OR GREATER: YES
CHA2DS2 VASC SCORE: 3

## 2022-02-23 NOTE — PROGRESS NOTES
Anticoagulation Summary  As of 2022    INR goal:  2.0-3.0   TTR:  --   INR used for dosin.00 (2022)   Warfarin maintenance plan:  5 mg (5 mg x 1) every day   Weekly warfarin total:  35 mg   Plan last modified:  Lina BALL Unruly (2022)   Next INR check:  2022   Target end date:  Indefinite    Indications    PAF (paroxysmal atrial fibrillation) (HCC) [I48.0]  Chronic anticoagulation [Z79.01]             Anticoagulation Episode Summary     INR check location:  Anticoagulation Clinic    Preferred lab:      Send INR reminders to:      Comments:        Anticoagulation Care Providers     Provider Role Specialty Phone number    Lauri العلي M.D. Referring Cardiovascular Disease (Cardiology) [phone]    [facility] Anticoagulation Services Responsible  [phone]        Anticoagulation Patient Findings  Patient Findings     Negatives:  Signs/symptoms of thrombosis, Signs/symptoms of bleeding, Laboratory test error suspected, Change in health, Change in alcohol use, Change in activity, Upcoming invasive procedure, Emergency department visit, Upcoming dental procedure, Missed doses, Extra doses, Change in medications, Change in diet/appetite, Hospital admission, Bruising, Other complaints        [id number] VASc Score: 4  HAS-BLED Score: 1      Pt is new to warfarin and new to [facility].  Discussed indication for warfarin therapy and INR goal range. Explained our services, hours of operation, warfarin therapy, potential SE, potential DI. Discussed diet at length, with an emphasis on foods rich in vitamin K.  Discussed monitoring parameters, such as blood in urine, blood in stool, discussed what to do if a dose is missed, or suspected as missed.  Emphasized importance of compliance including follow up. Discussed lifestyle choices of ETOH & smoking and its impact on therapy.      Anticoagulation Summary  As of 2022    INR goal:  2.0-3.0   TTR:  --   INR used for dosin.00 (2022)   Warfarin  maintenance plan:  5 mg (5 mg x 1) every day   Weekly warfarin total:  35 mg   Plan last modified:  Lina Tolliver (2/23/2022)   Next INR check:  2/25/2022   Target end date:  Indefinite    Indications    PAF (paroxysmal atrial fibrillation) (HCC) [I48.0]  Chronic anticoagulation [Z79.01]             Anticoagulation Episode Summary     INR check location:  Anticoagulation Clinic    Preferred lab:      Send INR reminders to:      Comments:        Anticoagulation Care Providers     Provider Role Specialty Phone number    Lauri العلي M.D. Referring Cardiovascular Disease (Cardiology) [phone]    [facility] Responsible  [phone]        Anticoagulation Patient Findings    [id number] VASc Score: 3  HAS-BLED Score: 1     Pt is on ASA or any other antiplatelet  yes Can pt be transitioned to DOAC? Pt was previously on xarelto, however is unable to afford it.  Pradaxa is also unaffordable.     Verified Xarelto, Eliquis, and Pradaxa tier two.    Pt signed new patient contract.  Copy will be scanned into media.      Pt denies any unusual s/s of bleeding, bruising, clotting or any changes to diet or medications.    CHADSVASC = 3    HASBLED= 1    HTN  Abnormal  Liver (cirrhosis, bilirubin 2x ULN, AST/ALT)    Kidney (SCr >2.6)  Stroke  Bleeding predisposition  Labile INR  Elderly (>65)  Drugs (Nsaid, antiplatelet, alcohol)        Added [facility]ticoagulation Services to care team    Pt is a new referral for anticoagulation management for AF from Dr. العلي. Pt has NOT yet started his warfarin yet.  Pt will begin warfarin 5mg po daily and RETEST in 2 days.  Standing order provided. Pt already has rx.    Pt has been on methylprednisone 2mg po daily, and celecoxib for RA.  Pt has been cautioned about increased risk of GI bleed. Pt continues to receive orencia infusions for his RA.              Lina Tolliver, Clinical Pharmacist, CDE, CACP

## 2022-02-23 NOTE — PATIENT INSTRUCTIONS
Call Lina for any issues [phone]   Call the Coumadin Clinic if you do not hear back on labs the same day [phone]    Start warfarin 5mg (1 tablet) daily and retest blood in 2 days 2- at [facility]    Lina Tolliver, Clinical Pharmacist, CDE, CACP

## 2022-02-23 NOTE — PROGRESS NOTES
Initial anticoag note and most recent cards note reviewed.  Patient with afib and chads vasc = 4    Pending further recommendations, we will continue with indefinite anticoagulation with warfarin as directed by cards    Will defer all management of rhythm, rate, and other cv issues, aside from anticoagulation, to cards Michael Bloch, MD  Anticoagulation Clinic    Cc: YINA Loya

## 2022-02-25 ENCOUNTER — ANTICOAGULATION MONITORING (OUTPATIENT)
Dept: VASCULAR LAB | Facility: MEDICAL CENTER | Age: 82
End: 2022-02-25
Payer: MEDICARE

## 2022-02-25 ENCOUNTER — PATIENT MESSAGE (OUTPATIENT)
Dept: CARDIOLOGY | Facility: MEDICAL CENTER | Age: 82
End: 2022-02-25
Payer: MEDICARE

## 2022-02-25 DIAGNOSIS — Z79.01 CHRONIC ANTICOAGULATION: ICD-10-CM

## 2022-02-25 DIAGNOSIS — I48.0 PAF (PAROXYSMAL ATRIAL FIBRILLATION) (HCC): ICD-10-CM

## 2022-02-28 ENCOUNTER — ANTICOAGULATION MONITORING (OUTPATIENT)
Dept: VASCULAR LAB | Facility: MEDICAL CENTER | Age: 82
End: 2022-02-28
Payer: MEDICARE

## 2022-02-28 DIAGNOSIS — I48.0 PAF (PAROXYSMAL ATRIAL FIBRILLATION) (HCC): ICD-10-CM

## 2022-02-28 DIAGNOSIS — Z79.01 CHRONIC ANTICOAGULATION: ICD-10-CM

## 2022-02-28 PROBLEM — R09.81 NASAL CONGESTION: Status: RESOLVED | Noted: 2019-01-25 | Resolved: 2022-02-28

## 2022-02-28 PROBLEM — R09.81 SINUS CONGESTION: Status: RESOLVED | Noted: 2019-01-25 | Resolved: 2022-02-28

## 2022-02-28 PROBLEM — J04.0 LARYNGITIS: Status: RESOLVED | Noted: 2019-01-25 | Resolved: 2022-02-28

## 2022-02-28 PROBLEM — F43.9 STRESS: Status: RESOLVED | Noted: 2020-06-04 | Resolved: 2022-02-28

## 2022-02-28 PROBLEM — R53.81 MALAISE: Status: RESOLVED | Noted: 2019-01-25 | Resolved: 2022-02-28

## 2022-02-28 PROBLEM — J40 BRONCHITIS: Status: RESOLVED | Noted: 2019-01-25 | Resolved: 2022-02-28

## 2022-02-28 LAB — INR PPP: 4.5 (ref 2–3.5)

## 2022-03-01 NOTE — PROGRESS NOTES
OP Anticoagulation Service Note    Date: 2022    Anticoagulation Summary  As of 2022    INR goal:  2.0-3.0   TTR:  --   INR used for dosin.50 (2022)   Warfarin maintenance plan:  5 mg (5 mg x 1) every day   Weekly warfarin total:  35 mg   Plan last modified:  Lina BALL Filter (2022)   Next INR check:  3/2/2022   Target end date:  Indefinite    Indications    PAF (paroxysmal atrial fibrillation) (HCC) [I48.0]  Chronic anticoagulation [Z79.01]             Anticoagulation Episode Summary     INR check location:  Anticoagulation Clinic    Preferred lab:      Send INR reminders to:      Comments:        Anticoagulation Care Providers     Provider Role Specialty Phone number    Lauri العلي M.D. Referring Cardiovascular Disease (Cardiology) [phone]    [facility] Responsible  [phone]        Anticoagulation Patient Findings      HPI:   The reason for today's call is to prevent morbidity and mortality from a blood clot and/or stroke and to reduce the risk of bleeding while on a anticoagulant.     PCP:  Fadumo Loya A.P.R.NFermin  [address] [facility] [id number]    Assessment:     • INR  supra-therapeutic.       Current Outpatient Medications:   •  warfarin, 5 mg, Oral, DAILY  •  methylPREDNISolone, Take  by mouth every day. 1/2 tab daily  •  celecoxib, 200 mg, Oral, BID  •  amLODIPine, 5 mg, Oral, DAILY  •  metFORMIN, 500 mg, Oral, BID WITH MEALS  •  atorvastatin, TAKE 1 TABLET BY MOUTH ONCE DAILY AT BEDTIME . APPOINTMENT REQUIRED FOR FUTURE REFILLS (Patient taking differently: 20 mg, Oral, DAILY)  •  escitalopram, TAKE 1 TABLET BY MOUTH ONCE DAILY . APPOINTMENT REQUIRED FOR FUTURE REFILLS  •  metoprolol tartrate, 12.5 mg, Oral, BID  •  hydroCHLOROthiazide, 12.5 mg, Oral, DAILY  •  lisinopril, 40 mg, Oral, Q EVENING  •  PreserVision AREDS 2, Take  by mouth. (Patient not taking: Reported on 2022)  •  tamsulosin, 0.4 mg, Oral, DAILY  •   traMADol, 50 mg, Oral, Q6HRS PRN  •  abatacept, Infuse  into a venous catheter.  •  Omega 3, 1,000 mg, Oral, BID WITH MEALS  •  albuterol, 2 Puff, Inhalation, Q4HRS PRN      Plan:     • Hold x 2 days then continue the same warfarin dose, as noted above.       Follow-up:     • Our protocol suggests we test in 2 days        Additional information discussed with patient:     • Asked patient to please call the anticoagulation clinic if they have any signs/symptoms of bleeding and/or thrombosis or any changes to diet or medications.      National recommendations regarding anticoagulation therapy:     The CHEST guidelines recommends frequent INR monitoring at regular intervals (a few days up to a max of 12 weeks) to ensure patients are on the proper dose of warfarin, and patients are not having any complications from therapy.  INRs can dramatically change over a short time period due to diet, medications, and medical conditions.     [facility]  Phone [phone] fax [phone]    This note was created using voice recognition software (Dragon). The accuracy of the dictation is limited by the abilities of the software. I have reviewed the note prior to signing, however some errors in grammar and context are still possible. If you have any questions related to this note please do not hesitate to contact our office.

## 2022-03-02 ENCOUNTER — ANTICOAGULATION MONITORING (OUTPATIENT)
Dept: VASCULAR LAB | Facility: MEDICAL CENTER | Age: 82
End: 2022-03-02
Payer: MEDICARE

## 2022-03-02 DIAGNOSIS — I48.0 PAF (PAROXYSMAL ATRIAL FIBRILLATION) (HCC): ICD-10-CM

## 2022-03-02 DIAGNOSIS — Z79.01 CHRONIC ANTICOAGULATION: ICD-10-CM

## 2022-03-02 LAB — INR PPP: 4.5 (ref 2–3.5)

## 2022-03-02 NOTE — PROGRESS NOTES
[place] [facility] [place].    Rohith Goodman, PatriciaD, BCACP

## 2022-03-02 NOTE — PROGRESS NOTES
Anticoagulation Summary  As of 3/2/2022    INR goal:  2.0-3.0   TTR:  --   INR used for dosin.50 (3/1/2022)   Warfarin maintenance plan:  5 mg (5 mg x 1) every day   Weekly warfarin total:  35 mg   Plan last modified:  Lina BALL Filter (2022)   Next INR check:  3/3/2022   Target end date:  Indefinite    Indications    PAF (paroxysmal atrial fibrillation) (HCC) [I48.0]  Chronic anticoagulation [Z79.01]             Anticoagulation Episode Summary     INR check location:  Anticoagulation Clinic    Preferred lab:      Send INR reminders to:      Comments:        Anticoagulation Care Providers     Provider Role Specialty Phone number    Lauri العلي M.D. Referring Cardiovascular Disease (Cardiology) [phone]    [facility]ticoagulation Services Responsible  [phone]        Anticoagulation Patient Findings    Left voicemail message to report a SUPRA therapeutic INR of 4.5.  Pt to continue holding warfarin dosing regimen. Requested pt contact the clinic for any s/s of unusual bleeding, bruising, clotting or any changes to diet or medication.     INR from 3/1 is the same as INR from - concerned there might be an error.  Requested caregiver to continue holding warfarin and test INR again tomorrow.  However if they have any questions or did not obtain an INR on 3/1, I have asked they contact our clinic to discuss further.     James Palm, PharmD   
DISPLAY PLAN FREE TEXT

## 2022-03-03 ENCOUNTER — TELEPHONE (OUTPATIENT)
Dept: VASCULAR LAB | Facility: MEDICAL CENTER | Age: 82
End: 2022-03-03
Payer: MEDICARE

## 2022-03-03 NOTE — PROGRESS NOTES
Anticoagulation Summary  As of 3/2/2022    INR goal:  2.0-3.0   TTR:  --   INR used for dosin.41 (3/2/2022)   Warfarin maintenance plan:  5 mg (5 mg x 1) every Mon, Wed, Fri; 2.5 mg (5 mg x 0.5) all other days   Weekly warfarin total:  25 mg   Plan last modified:  Rohith Goodman PharmD (3/2/2022)   Next INR check:  3/7/2022   Target end date:  Indefinite    Indications    PAF (paroxysmal atrial fibrillation) (HCC) [I48.0]  Chronic anticoagulation [Z79.01]             Anticoagulation Episode Summary     INR check location:  Anticoagulation Clinic    Preferred lab:      Send INR reminders to:      Comments:        Anticoagulation Care Providers     Provider Role Specialty Phone number    Lauri العلي M.D. Referring Cardiovascular Disease (Cardiology) [phone]    [facility] Anticoagulation Services Responsible  [phone]          Refer to Anticoagulation Patient Findings for HPI  Patient Findings     Negatives:  Signs/symptoms of thrombosis, Signs/symptoms of bleeding, Laboratory test error suspected, Change in health, Change in alcohol use, Change in activity, Upcoming invasive procedure, Emergency department visit, Upcoming dental procedure, Missed doses, Extra doses, Change in medications, Change in diet/appetite, Hospital admission, Bruising, Other complaints          Spoke with patient wife to report a therapeutic INR.      Pt instructed to continue with current warfarin dosing regimen, confirms dosing.   Will follow up in 4 day(s).     Rohith Goodman PharmD, BCACP

## 2022-03-03 NOTE — TELEPHONE ENCOUNTER
Received call from pt's wife asking for clarification on warfarin dosing  Relayed info from anticoag encounter from JACQUE Goodman with INR of 2.41    Audra Lei, PharmD

## 2022-03-08 ENCOUNTER — ANTICOAGULATION MONITORING (OUTPATIENT)
Dept: VASCULAR LAB | Facility: MEDICAL CENTER | Age: 82
End: 2022-03-08
Payer: MEDICARE

## 2022-03-08 ENCOUNTER — TELEPHONE (OUTPATIENT)
Dept: VASCULAR LAB | Facility: MEDICAL CENTER | Age: 82
End: 2022-03-08
Payer: MEDICARE

## 2022-03-08 DIAGNOSIS — Z79.01 CHRONIC ANTICOAGULATION: ICD-10-CM

## 2022-03-08 DIAGNOSIS — I48.0 PAF (PAROXYSMAL ATRIAL FIBRILLATION) (HCC): ICD-10-CM

## 2022-03-08 NOTE — TELEPHONE ENCOUNTER
Pt was due for INR on 3/7  S/w pt - he is at the lab now  Will await INR results    Audra Lei, PatriciaD

## 2022-03-09 NOTE — PROGRESS NOTES
Anticoagulation Summary  As of 3/8/2022    INR goal:  2.0-3.0   TTR:  100.0 % (3 d)   INR used for dosin.06 (3/8/2022)   Warfarin maintenance plan:  5 mg (5 mg x 1) every Mon, Wed, Fri; 2.5 mg (5 mg x 0.5) all other days   Weekly warfarin total:  25 mg   Plan last modified:  Patricia SueD (3/2/2022)   Next INR check:  3/16/2022   Target end date:  Indefinite    Indications    PAF (paroxysmal atrial fibrillation) ([facility]) [I48.0]  Chronic anticoagulation [Z79.01]             Anticoagulation Episode Summary     INR check location:  Anticoagulation Clinic    Preferred lab:      Send INR reminders to:      Comments:        Anticoagulation Care Providers     Provider Role Specialty Phone number    Lauri العلي M.D. Referring Cardiovascular Disease (Cardiology) [phone]    [facility] Anticoagulation Services Responsible  [phone]          Refer to Anticoagulation Patient Findings for HPI  Patient Findings     Negatives:  Signs/symptoms of thrombosis, Signs/symptoms of bleeding, Laboratory test error suspected, Change in health, Change in alcohol use, Change in activity, Upcoming invasive procedure, Emergency department visit, Upcoming dental procedure, Missed doses, Extra doses, Change in medications, Change in diet/appetite, Hospital admission, Bruising, Other complaints          Spoke with patient Edward to report a therapeutic INR.      Pt is NOT on antiplatelet therapy.    Pt instructed to continue with current warfarin dosing regimen, confirms dosing.   Will follow up in 1 week(s).     Wally Villasenor, PatriciaD

## 2022-03-17 ENCOUNTER — TELEPHONE (OUTPATIENT)
Dept: VASCULAR LAB | Facility: MEDICAL CENTER | Age: 82
End: 2022-03-17
Payer: MEDICARE

## 2022-03-17 NOTE — TELEPHONE ENCOUNTER
Left VM message to reschedule missed anticoagulation services visit  Lina Tolliver, Clinical Pharmacist, CDE, CACP

## 2022-03-23 ENCOUNTER — ANTICOAGULATION VISIT (OUTPATIENT)
Dept: CARDIOLOGY | Facility: PHYSICIAN GROUP | Age: 82
End: 2022-03-23
Payer: MEDICARE

## 2022-03-23 DIAGNOSIS — Z79.01 CHRONIC ANTICOAGULATION: ICD-10-CM

## 2022-03-23 DIAGNOSIS — I48.0 PAF (PAROXYSMAL ATRIAL FIBRILLATION) (HCC): ICD-10-CM

## 2022-03-23 LAB — INR PPP: 3.1 (ref 2–3.5)

## 2022-03-23 PROCEDURE — 99211 OFF/OP EST MAY X REQ PHY/QHP: CPT | Performed by: NURSE PRACTITIONER

## 2022-03-23 PROCEDURE — 85610 PROTHROMBIN TIME: CPT | Performed by: NURSE PRACTITIONER

## 2022-03-23 NOTE — PROGRESS NOTES
Anticoagulation Summary  As of 3/23/2022    INR goal:  2.0-3.0   TTR:  92.2 % (2.6 wk)   INR used for dosing:  3.10 (3/23/2022)   Warfarin maintenance plan:  5 mg (5 mg x 1) every Mon, Fri; 2.5 mg (5 mg x 0.5) all other days   Weekly warfarin total:  22.5 mg   Plan last modified:  Lina Tolliver (3/23/2022)   Next INR check:  4/6/2022   Target end date:  Indefinite    Indications    PAF (paroxysmal atrial fibrillation) ([facility]) [I48.0]  Chronic anticoagulation [Z79.01]             Anticoagulation Episode Summary     INR check location:  Anticoagulation Clinic    Preferred lab:      Send INR reminders to:      Comments:        Anticoagulation Care Providers     Provider Role Specialty Phone number    Lauri العلي M.D. Referring Cardiovascular Disease (Cardiology) [phone]    [facility] Anticoagulation Services Responsible  [phone]        Anticoagulation Patient Findings          Pt missed last visit.  Pt is now supra therapeutic today.  Will reduce weekly dose to 22.5mg/week.  Follow up in 2 weeks, to reduce the risk of adverse events related to this high risk medication, warfarin.    Lina Tolliver, Clinical Pharmacist

## 2022-04-07 ENCOUNTER — TELEPHONE (OUTPATIENT)
Dept: VASCULAR LAB | Facility: MEDICAL CENTER | Age: 82
End: 2022-04-07
Payer: MEDICARE

## 2022-05-09 DIAGNOSIS — I10 ESSENTIAL HYPERTENSION: Chronic | ICD-10-CM

## 2022-05-10 RX ORDER — HYDROCHLOROTHIAZIDE 12.5 MG/1
12.5 TABLET ORAL DAILY
Qty: 90 TABLET | Refills: 3 | Status: SHIPPED | OUTPATIENT
Start: 2022-05-10 | End: 2023-06-22

## 2022-08-03 DIAGNOSIS — I48.0 PAF (PAROXYSMAL ATRIAL FIBRILLATION) (HCC): ICD-10-CM

## 2022-08-03 NOTE — TELEPHONE ENCOUNTER
Is the patient due for a refill? Yes    Was the patient seen the past year? Yes    Date of last office visit: 2/10/22    Does the patient have an upcoming appointment?  No   If yes, When? They will be back in a year per Dr. HOLLAND    Provider to refill:Dr. HOLLAND    Does the patients insurance require a 100 day supply?  No

## 2022-08-04 RX ORDER — LISINOPRIL 40 MG/1
40 TABLET ORAL EVERY EVENING
Qty: 90 TABLET | Refills: 3 | Status: SHIPPED | OUTPATIENT
Start: 2022-08-04 | End: 2023-08-15 | Stop reason: SDUPTHER

## 2022-08-21 DIAGNOSIS — I48.0 PAF (PAROXYSMAL ATRIAL FIBRILLATION) (HCC): ICD-10-CM

## 2022-08-21 DIAGNOSIS — Z79.01 CHRONIC ANTICOAGULATION: ICD-10-CM

## 2022-08-24 RX ORDER — WARFARIN SODIUM 5 MG/1
TABLET ORAL
Qty: 30 TABLET | Refills: 0 | Status: SHIPPED | OUTPATIENT
Start: 2022-08-24 | End: 2022-09-14

## 2022-09-13 DIAGNOSIS — I48.0 PAF (PAROXYSMAL ATRIAL FIBRILLATION) (HCC): ICD-10-CM

## 2022-09-13 DIAGNOSIS — Z79.01 CHRONIC ANTICOAGULATION: ICD-10-CM

## 2022-09-14 RX ORDER — WARFARIN SODIUM 5 MG/1
TABLET ORAL
Qty: 30 TABLET | Refills: 0 | Status: SHIPPED | OUTPATIENT
Start: 2022-09-14 | End: 2022-10-14

## 2022-09-14 NOTE — TELEPHONE ENCOUNTER
Pt overdue for INR and requesting warfarin refill  Last INR as in June 2022    S/w pt - he will get INR drawn [facility] [place]    Audra Lei, PharmD

## 2022-09-15 ENCOUNTER — ANTICOAGULATION MONITORING (OUTPATIENT)
Dept: VASCULAR LAB | Facility: MEDICAL CENTER | Age: 82
End: 2022-09-15
Payer: COMMERCIAL

## 2022-09-15 DIAGNOSIS — I48.0 PAF (PAROXYSMAL ATRIAL FIBRILLATION) (HCC): ICD-10-CM

## 2022-09-15 DIAGNOSIS — Z79.01 CHRONIC ANTICOAGULATION: ICD-10-CM

## 2022-09-15 NOTE — PROGRESS NOTES
OP Anticoagulation Service Note    Date: 9/15/2022    Anticoagulation Summary  As of 9/15/2022      INR goal:  2.0-3.0   TTR:  87.5 % (6.5 mo)   INR used for dosin.33 (9/15/2022)   Warfarin maintenance plan:  5 mg (5 mg x 1) every Mon, Fri; 2.5 mg (5 mg x 0.5) all other days   Weekly warfarin total:  22.5 mg   Plan last modified:  Lina Tolliver (3/23/2022)   Next INR check:  2022   Target end date:  Indefinite    Indications    PAF (paroxysmal atrial fibrillation) (HCC) [I48.0]  Chronic anticoagulation [Z79.01]                 Anticoagulation Episode Summary       INR check location:  Anticoagulation Clinic    Preferred lab:      Send INR reminders to:      Comments:            Anticoagulation Care Providers       Provider Role Specialty Phone number    Lauri العلي M.D. Referring Cardiovascular Disease (Cardiology) [phone]    [facility]tico[facility] Responsible  [phone]          Anticoagulation Patient Findings          HPI:   The reason for today's call is to prevent morbidity and mortality from a blood clot and/or stroke and to reduce the risk of bleeding while on a anticoagulant.     PCP:  EVIN FormanRFerminNFermin  [address] [address]    Assessment:     INR  therapeutic.     Lab Results   Component Value Date/Time    BUN 27 (H) 2022 11:55 AM    CREATININE 1.4 (H) 2022 11:55 AM     Lab Results   Component Value Date/Time    HEMOGLOBIN 12.2 (L) 2022 11:55 AM    HEMATOCRIT 37.6 (L) 2022 11:55 AM    PLATELETCT 247 2022 11:55 AM    ALKPHOSPHAT 63 2022 11:55 AM    ASTSGOT 16 2022 11:55 AM    ALTSGPT 22 2022 11:55 AM          Current Outpatient Medications:     warfarin, TAKE 1/2 TO 1 TABLET BY  MOUTH DAILY OR AS DIRECTED  BY COUMADIN CLINIC. Needs INR for further refills.    lisinopril, 40 mg, Oral, Q EVENING    hydroCHLOROthiazide, 12.5 mg, Oral, DAILY    atorvastatin, TAKE 1 TABLET BY MOUTH ONCE  DAILY AT BEDTIME .    escitalopram, 20 mg, Oral, DAILY    metformin, 1,000 mg, Oral, BID WITH MEALS    methylPREDNISolone, Take  by mouth every day. 1/2 tab daily    celecoxib, 200 mg, Oral, BID    amLODIPine, 5 mg, Oral, DAILY    metoprolol tartrate, 12.5 mg, Oral, BID    PreserVision AREDS 2, Take  by mouth. (Patient not taking: Reported on 6/20/2022)    tamsulosin, 0.4 mg, Oral, DAILY    traMADol, 50 mg, Oral, Q6HRS PRN    abatacept, Infuse  into a venous catheter.    Omega 3, 1,000 mg, Oral, BID WITH MEALS    albuterol, 2 Puff, Inhalation, Q4HRS PRN      Plan:     Continue the same warfarin dose, as noted above.       Follow-up:     test in 12 weeks.        Additional information discussed with patient:     Asked patient to please call the anticoagulation clinic if they have any signs/symptoms of bleeding and/or thrombosis or any changes to diet or medications.      National recommendations regarding anticoagulation therapy:     The CHEST guidelines recommends frequent INR monitoring at regular intervals (a few days up to a max of 12 weeks) to ensure patients are on the proper dose of warfarin, and patients are not having any complications from therapy.  INRs can dramatically change over a short time period due to diet, medications, and medical conditions.       Andre Alanis, PharmD, MS, BCACP, [facility]  Phone: [phone]  Fax: [phone]  On call: [phone]  General scheduling/information [phone]  For emergencies please dial [phone]  Please do not use [facility] for urgent matters, call the phone numbers listed above.    This note was created using voice recognition software (Dragon). The accuracy of the dictation is limited by the abilities of the software. I have reviewed the note prior to signing, however some errors in grammar and context are still possible. If you have any questions related to this note please do not hesitate to contact our office.

## 2022-10-31 PROBLEM — R06.2 WHEEZING: Status: RESOLVED | Noted: 2019-01-25 | Resolved: 2022-10-31

## 2022-10-31 PROBLEM — R05.9 COUGH: Status: RESOLVED | Noted: 2019-01-25 | Resolved: 2022-10-31

## 2022-11-18 PROBLEM — J96.01 ACUTE HYPOXEMIC RESPIRATORY FAILURE (HCC): Status: ACTIVE | Noted: 2022-11-18

## 2022-11-18 PROBLEM — J11.1 INFLUENZA: Status: ACTIVE | Noted: 2022-11-18

## 2022-11-18 PROBLEM — J44.1 COPD WITH EXACERBATION (HCC): Status: ACTIVE | Noted: 2022-11-18

## 2022-11-18 PROBLEM — N18.30 CKD (CHRONIC KIDNEY DISEASE) STAGE 3, GFR 30-59 ML/MIN: Status: ACTIVE | Noted: 2022-11-18

## 2022-11-19 PROBLEM — J96.01 ACUTE HYPOXEMIC RESPIRATORY FAILURE (HCC): Status: RESOLVED | Noted: 2022-11-18 | Resolved: 2022-11-19

## 2023-01-11 DIAGNOSIS — I10 ESSENTIAL HYPERTENSION: Chronic | ICD-10-CM

## 2023-01-11 NOTE — TELEPHONE ENCOUNTER
Is the patient due for a refill? Yes    Was the patient seen the past year? Yes    Date of last office visit: 2/10/22    Does the patient have an upcoming appointment?  No      Provider to refill: CW     Does the patients insurance require a 100 day supply?  No

## 2023-01-28 DIAGNOSIS — I10 ESSENTIAL HYPERTENSION: Chronic | ICD-10-CM

## 2023-01-30 RX ORDER — AMLODIPINE BESYLATE 5 MG/1
TABLET ORAL
Qty: 90 TABLET | Refills: 0 | Status: SHIPPED | OUTPATIENT
Start: 2023-01-30 | End: 2023-04-25

## 2023-01-30 NOTE — TELEPHONE ENCOUNTER
Is the patient due for a refill? Yes    Was the patient seen the past year? Yes    Date of last office visit: 2/10/22    Does the patient have an upcoming appointment?  No   If yes, When?     Provider to refill:CW    Does the patients insurance require a 100 day supply?  No

## 2023-02-10 DIAGNOSIS — I48.0 PAF (PAROXYSMAL ATRIAL FIBRILLATION) (HCC): ICD-10-CM

## 2023-03-20 DIAGNOSIS — I10 ESSENTIAL HYPERTENSION: Chronic | ICD-10-CM

## 2023-03-20 NOTE — TELEPHONE ENCOUNTER
Is the patient due for a refill? Yes- final courtesy refill    Was the patient seen the past year? No    Date of last office visit: 2/10/2022    Does the patient have an upcoming appointment?  Yes   If yes, When? 5/23/2023    Provider to refill:CW    Does the patients insurance require a 100 day supply?  No

## 2023-04-05 ENCOUNTER — TELEPHONE (OUTPATIENT)
Dept: VASCULAR LAB | Facility: MEDICAL CENTER | Age: 83
End: 2023-04-05
Payer: COMMERCIAL

## 2023-04-05 DIAGNOSIS — Z79.01 CHRONIC ANTICOAGULATION: ICD-10-CM

## 2023-04-05 NOTE — TELEPHONE ENCOUNTER
Pt is overdue for INR    1st call  Pt will get INR drawn in the next week    Pt last seen 9/15/22    Discharge criteria:  X  3 calls  X 1 letter  ? Over 3 months    Audra Lei, PharmD

## 2023-04-23 DIAGNOSIS — I10 ESSENTIAL HYPERTENSION: Chronic | ICD-10-CM

## 2023-04-24 NOTE — TELEPHONE ENCOUNTER
Is the patient due for a refill? Yes    Was the patient seen the past year? Yes    Date of last office visit: 2/10/2022    Does the patient have an upcoming appointment?  Yes   If yes, When? 5/23/2023    Provider to refill:CW    Does the patients insurance require a 100 day supply?  No

## 2023-04-25 ENCOUNTER — ANTICOAGULATION MONITORING (OUTPATIENT)
Dept: VASCULAR LAB | Facility: MEDICAL CENTER | Age: 83
End: 2023-04-25
Payer: COMMERCIAL

## 2023-04-25 DIAGNOSIS — Z79.01 CHRONIC ANTICOAGULATION: Chronic | ICD-10-CM

## 2023-04-25 DIAGNOSIS — I48.0 PAF (PAROXYSMAL ATRIAL FIBRILLATION) (HCC): Chronic | ICD-10-CM

## 2023-04-25 RX ORDER — AMLODIPINE BESYLATE 5 MG/1
TABLET ORAL
Qty: 30 TABLET | Refills: 0 | Status: SHIPPED | OUTPATIENT
Start: 2023-04-25 | End: 2023-08-15 | Stop reason: SDUPTHER

## 2023-04-25 NOTE — PROGRESS NOTES
Anticoagulation Summary  As of 2023      INR goal:  2.0-3.0   TTR:  78.2 % (1.1 y)   INR used for dosin.40 (2023)   Warfarin maintenance plan:  5 mg (5 mg x 1) every e, Thu; 2.5 mg (5 mg x 0.5) all other days   Weekly warfarin total:  22.5 mg   Plan last modified:  Audra Lei PharmD (2023)   Next INR check:  2023   Target end date:  Indefinite    Indications    PAF (paroxysmal atrial fibrillation) ([facility]) [I48.0]  Chronic anticoagulation [Z79.01]                 Anticoagulation Episode Summary       INR check location:  Anticoagulation Clinic    Preferred lab:      Send INR reminders to:      Comments:            Anticoagulation Care Providers       Provider Role Specialty Phone number    Lauri العلي M.D. Referring Cardiovascular Disease (Cardiology) [phone]    [facility] Anticoagulation Services Responsible  [phone]            Refer to Anticoagulation Patient Findings for HPI  Patient Findings       Positives:  Missed doses (pt's wife thinks he might have missed a dose)    Negatives:  Signs/symptoms of thrombosis, Signs/symptoms of bleeding, Laboratory test error suspected, Change in health, Change in alcohol use, Change in activity, Upcoming invasive procedure, Emergency department visit, Upcoming dental procedure, Extra doses, Change in medications, Change in diet/appetite, Hospital admission, Bruising, Other complaints            Spoke with pt's wife.  INR is subtherapeutic.     Pt verifies warfarin weekly dosing.     Pt is NOT on antiplatelet therapy, but on NSAID therapy w/ celecoxib    Will have pt bolus w/ 5mg tomorrow, then continue regimen    Repeat INR in 2 week(s).     Audra Lei, PatriciaD

## 2023-06-22 ENCOUNTER — TELEPHONE (OUTPATIENT)
Dept: CARDIOLOGY | Facility: MEDICAL CENTER | Age: 83
End: 2023-06-22
Payer: MEDICARE

## 2023-09-12 ENCOUNTER — TELEPHONE (OUTPATIENT)
Dept: VASCULAR LAB | Facility: MEDICAL CENTER | Age: 83
End: 2023-09-12
Payer: MEDICARE

## 2023-09-12 NOTE — TELEPHONE ENCOUNTER
Patient is overdue for an INR check.  S/w pt. They will get INR checked within the next week.    Naa Coombs, PatriciaD, BCACP

## 2023-12-01 ENCOUNTER — HOSPITAL ENCOUNTER (OUTPATIENT)
Dept: RADIOLOGY | Facility: MEDICAL CENTER | Age: 83
End: 2023-12-01

## 2023-12-01 ENCOUNTER — APPOINTMENT (OUTPATIENT)
Dept: RADIOLOGY | Facility: MEDICAL CENTER | Age: 83
DRG: 193 | End: 2023-12-01
Attending: STUDENT IN AN ORGANIZED HEALTH CARE EDUCATION/TRAINING PROGRAM
Payer: MEDICARE

## 2023-12-01 ENCOUNTER — HOSPITAL ENCOUNTER (INPATIENT)
Facility: MEDICAL CENTER | Age: 83
LOS: 1 days | DRG: 193 | End: 2023-12-02
Attending: STUDENT IN AN ORGANIZED HEALTH CARE EDUCATION/TRAINING PROGRAM | Admitting: STUDENT IN AN ORGANIZED HEALTH CARE EDUCATION/TRAINING PROGRAM
Payer: MEDICARE
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
## 2023-12-01 DIAGNOSIS — J18.9 PNEUMONIA DUE TO INFECTIOUS ORGANISM, UNSPECIFIED LATERALITY, UNSPECIFIED PART OF LUNG: ICD-10-CM

## 2023-12-01 DIAGNOSIS — Z63.8 CAREGIVER ROLE STRAIN: ICD-10-CM

## 2023-12-01 PROBLEM — G93.40 ACUTE ENCEPHALOPATHY: Status: ACTIVE | Noted: 2023-12-01

## 2023-12-01 LAB
ALBUMIN SERPL BCP-MCNC: 3.6 G/DL (ref 3.2–4.9)
ALBUMIN/GLOB SERPL: 1.3 G/DL
ALP SERPL-CCNC: 61 U/L (ref 30–99)
ALT SERPL-CCNC: 16 U/L (ref 2–50)
AMMONIA PLAS-SCNC: 22 UMOL/L (ref 11–45)
ANION GAP SERPL CALC-SCNC: 11 MMOL/L (ref 7–16)
AST SERPL-CCNC: 14 U/L (ref 12–45)
BASOPHILS # BLD AUTO: 0.1 % (ref 0–1.8)
BASOPHILS # BLD: 0.02 K/UL (ref 0–0.12)
BILIRUB SERPL-MCNC: 0.2 MG/DL (ref 0.1–1.5)
BUN SERPL-MCNC: 19 MG/DL (ref 8–22)
CALCIUM ALBUM COR SERPL-MCNC: 9 MG/DL (ref 8.5–10.5)
CALCIUM SERPL-MCNC: 8.7 MG/DL (ref 8.5–10.5)
CHLORIDE SERPL-SCNC: 107 MMOL/L (ref 96–112)
CO2 SERPL-SCNC: 22 MMOL/L (ref 20–33)
CREAT SERPL-MCNC: 1.1 MG/DL (ref 0.5–1.4)
EKG IMPRESSION: NORMAL
EOSINOPHIL # BLD AUTO: 0.06 K/UL (ref 0–0.51)
EOSINOPHIL NFR BLD: 0.4 % (ref 0–6.9)
ERYTHROCYTE [DISTWIDTH] IN BLOOD BY AUTOMATED COUNT: 45.5 FL (ref 35.9–50)
FLUAV RNA SPEC QL NAA+PROBE: NEGATIVE
FLUBV RNA SPEC QL NAA+PROBE: NEGATIVE
GFR SERPLBLD CREATININE-BSD FMLA CKD-EPI: 67 ML/MIN/1.73 M 2
GLOBULIN SER CALC-MCNC: 2.7 G/DL (ref 1.9–3.5)
GLUCOSE SERPL-MCNC: 171 MG/DL (ref 65–99)
HCT VFR BLD AUTO: 32.5 % (ref 42–52)
HGB BLD-MCNC: 10.6 G/DL (ref 14–18)
IMM GRANULOCYTES # BLD AUTO: 0.05 K/UL (ref 0–0.11)
IMM GRANULOCYTES NFR BLD AUTO: 0.4 % (ref 0–0.9)
INR PPP: 2.74 (ref 0.87–1.13)
LYMPHOCYTES # BLD AUTO: 1.05 K/UL (ref 1–4.8)
LYMPHOCYTES NFR BLD: 7.9 % (ref 22–41)
MCH RBC QN AUTO: 28.3 PG (ref 27–33)
MCHC RBC AUTO-ENTMCNC: 32.6 G/DL (ref 32.3–36.5)
MCV RBC AUTO: 86.9 FL (ref 81.4–97.8)
MONOCYTES # BLD AUTO: 0.91 K/UL (ref 0–0.85)
MONOCYTES NFR BLD AUTO: 6.8 % (ref 0–13.4)
NEUTROPHILS # BLD AUTO: 11.26 K/UL (ref 1.82–7.42)
NEUTROPHILS NFR BLD: 84.4 % (ref 44–72)
NRBC # BLD AUTO: 0 K/UL
NRBC BLD-RTO: 0 /100 WBC (ref 0–0.2)
PLATELET # BLD AUTO: 230 K/UL (ref 164–446)
PMV BLD AUTO: 10.5 FL (ref 9–12.9)
POTASSIUM SERPL-SCNC: 4.5 MMOL/L (ref 3.6–5.5)
PROCALCITONIN SERPL-MCNC: 0.23 NG/ML
PROT SERPL-MCNC: 6.3 G/DL (ref 6–8.2)
PROTHROMBIN TIME: 29.4 SEC (ref 12–14.6)
RBC # BLD AUTO: 3.74 M/UL (ref 4.7–6.1)
RSV RNA SPEC QL NAA+PROBE: NEGATIVE
SARS-COV-2 RNA RESP QL NAA+PROBE: NOTDETECTED
SODIUM SERPL-SCNC: 140 MMOL/L (ref 135–145)
SPECIMEN SOURCE: NORMAL
TSH SERPL DL<=0.005 MIU/L-ACNC: 1.07 UIU/ML (ref 0.38–5.33)
VIT B12 SERPL-MCNC: 376 PG/ML (ref 211–911)
WBC # BLD AUTO: 13.4 K/UL (ref 4.8–10.8)

## 2023-12-01 PROCEDURE — 97162 PT EVAL MOD COMPLEX 30 MIN: CPT

## 2023-12-01 PROCEDURE — A9270 NON-COVERED ITEM OR SERVICE: HCPCS | Performed by: STUDENT IN AN ORGANIZED HEALTH CARE EDUCATION/TRAINING PROGRAM

## 2023-12-01 PROCEDURE — 82607 VITAMIN B-12: CPT

## 2023-12-01 PROCEDURE — 770006 HCHG ROOM/CARE - MED/SURG/GYN SEMI*

## 2023-12-01 PROCEDURE — 51798 US URINE CAPACITY MEASURE: CPT

## 2023-12-01 PROCEDURE — 85025 COMPLETE CBC W/AUTO DIFF WBC: CPT

## 2023-12-01 PROCEDURE — 700111 HCHG RX REV CODE 636 W/ 250 OVERRIDE (IP): Performed by: STUDENT IN AN ORGANIZED HEALTH CARE EDUCATION/TRAINING PROGRAM

## 2023-12-01 PROCEDURE — C9803 HOPD COVID-19 SPEC COLLECT: HCPCS | Performed by: STUDENT IN AN ORGANIZED HEALTH CARE EDUCATION/TRAINING PROGRAM

## 2023-12-01 PROCEDURE — 700102 HCHG RX REV CODE 250 W/ 637 OVERRIDE(OP): Performed by: STUDENT IN AN ORGANIZED HEALTH CARE EDUCATION/TRAINING PROGRAM

## 2023-12-01 PROCEDURE — 0241U HCHG SARS-COV-2 COVID-19 NFCT DS RESP RNA 4 TRGT MIC: CPT

## 2023-12-01 PROCEDURE — A9270 NON-COVERED ITEM OR SERVICE: HCPCS | Performed by: INTERNAL MEDICINE

## 2023-12-01 PROCEDURE — 85610 PROTHROMBIN TIME: CPT

## 2023-12-01 PROCEDURE — 90662 IIV NO PRSV INCREASED AG IM: CPT | Performed by: STUDENT IN AN ORGANIZED HEALTH CARE EDUCATION/TRAINING PROGRAM

## 2023-12-01 PROCEDURE — 700102 HCHG RX REV CODE 250 W/ 637 OVERRIDE(OP): Performed by: INTERNAL MEDICINE

## 2023-12-01 PROCEDURE — 87040 BLOOD CULTURE FOR BACTERIA: CPT | Mod: 91

## 2023-12-01 PROCEDURE — 71045 X-RAY EXAM CHEST 1 VIEW: CPT

## 2023-12-01 PROCEDURE — 84425 ASSAY OF VITAMIN B-1: CPT

## 2023-12-01 PROCEDURE — 93005 ELECTROCARDIOGRAM TRACING: CPT | Performed by: STUDENT IN AN ORGANIZED HEALTH CARE EDUCATION/TRAINING PROGRAM

## 2023-12-01 PROCEDURE — 99223 1ST HOSP IP/OBS HIGH 75: CPT | Mod: AI | Performed by: STUDENT IN AN ORGANIZED HEALTH CARE EDUCATION/TRAINING PROGRAM

## 2023-12-01 PROCEDURE — 84145 PROCALCITONIN (PCT): CPT

## 2023-12-01 PROCEDURE — 90471 IMMUNIZATION ADMIN: CPT

## 2023-12-01 PROCEDURE — 82140 ASSAY OF AMMONIA: CPT

## 2023-12-01 PROCEDURE — 80053 COMPREHEN METABOLIC PANEL: CPT

## 2023-12-01 PROCEDURE — 93010 ELECTROCARDIOGRAM REPORT: CPT | Performed by: INTERNAL MEDICINE

## 2023-12-01 PROCEDURE — 84443 ASSAY THYROID STIM HORMONE: CPT

## 2023-12-01 RX ORDER — ATORVASTATIN CALCIUM 20 MG/1
20 TABLET, FILM COATED ORAL EVERY EVENING
Status: DISCONTINUED | OUTPATIENT
Start: 2023-12-01 | End: 2023-12-02 | Stop reason: HOSPADM

## 2023-12-01 RX ORDER — AMLODIPINE BESYLATE 5 MG/1
5 TABLET ORAL EVERY EVENING
Status: DISCONTINUED | OUTPATIENT
Start: 2023-12-01 | End: 2023-12-02 | Stop reason: HOSPADM

## 2023-12-01 RX ORDER — AMOXICILLIN AND CLAVULANATE POTASSIUM 875; 125 MG/1; MG/1
1 TABLET, FILM COATED ORAL EVERY 12 HOURS
Status: DISCONTINUED | OUTPATIENT
Start: 2023-12-01 | End: 2023-12-02 | Stop reason: HOSPADM

## 2023-12-01 RX ORDER — POLYETHYLENE GLYCOL 3350 17 G/17G
1 POWDER, FOR SOLUTION ORAL
Status: DISCONTINUED | OUTPATIENT
Start: 2023-12-01 | End: 2023-12-02 | Stop reason: HOSPADM

## 2023-12-01 RX ORDER — HYDROCHLOROTHIAZIDE 25 MG/1
12.5 TABLET ORAL DAILY
Status: DISCONTINUED | OUTPATIENT
Start: 2023-12-01 | End: 2023-12-02 | Stop reason: HOSPADM

## 2023-12-01 RX ORDER — LISINOPRIL 20 MG/1
40 TABLET ORAL DAILY
Status: DISCONTINUED | OUTPATIENT
Start: 2023-12-01 | End: 2023-12-02 | Stop reason: HOSPADM

## 2023-12-01 RX ORDER — ESCITALOPRAM OXALATE 10 MG/1
20 TABLET ORAL EVERY EVENING
Status: DISCONTINUED | OUTPATIENT
Start: 2023-12-01 | End: 2023-12-02 | Stop reason: HOSPADM

## 2023-12-01 RX ORDER — WARFARIN SODIUM 2.5 MG/1
2.5 TABLET ORAL
Status: DISCONTINUED | OUTPATIENT
Start: 2023-12-01 | End: 2023-12-02 | Stop reason: HOSPADM

## 2023-12-01 RX ORDER — BISACODYL 10 MG
10 SUPPOSITORY, RECTAL RECTAL
Status: DISCONTINUED | OUTPATIENT
Start: 2023-12-01 | End: 2023-12-02 | Stop reason: HOSPADM

## 2023-12-01 RX ORDER — WARFARIN SODIUM 5 MG/1
5 TABLET ORAL
Status: DISCONTINUED | OUTPATIENT
Start: 2023-12-05 | End: 2023-12-02 | Stop reason: HOSPADM

## 2023-12-01 RX ORDER — AMOXICILLIN 250 MG
2 CAPSULE ORAL 2 TIMES DAILY
Status: DISCONTINUED | OUTPATIENT
Start: 2023-12-01 | End: 2023-12-02 | Stop reason: HOSPADM

## 2023-12-01 RX ORDER — AZITHROMYCIN 250 MG/1
500 TABLET, FILM COATED ORAL DAILY
Status: DISCONTINUED | OUTPATIENT
Start: 2023-12-02 | End: 2023-12-01

## 2023-12-01 RX ORDER — ALBUTEROL SULFATE 90 UG/1
2 AEROSOL, METERED RESPIRATORY (INHALATION) EVERY 4 HOURS PRN
Status: DISCONTINUED | OUTPATIENT
Start: 2023-12-01 | End: 2023-12-02 | Stop reason: HOSPADM

## 2023-12-01 RX ADMIN — ATORVASTATIN CALCIUM 20 MG: 20 TABLET, FILM COATED ORAL at 17:19

## 2023-12-01 RX ADMIN — METOPROLOL TARTRATE 12.5 MG: 25 TABLET, FILM COATED ORAL at 17:18

## 2023-12-01 RX ADMIN — AMOXICILLIN AND CLAVULANATE POTASSIUM 1 TABLET: 875; 125 TABLET, FILM COATED ORAL at 10:39

## 2023-12-01 RX ADMIN — HYDROCHLOROTHIAZIDE 12.5 MG: 25 TABLET ORAL at 09:03

## 2023-12-01 RX ADMIN — METOPROLOL TARTRATE 12.5 MG: 25 TABLET, FILM COATED ORAL at 09:03

## 2023-12-01 RX ADMIN — AMOXICILLIN AND CLAVULANATE POTASSIUM 1 TABLET: 875; 125 TABLET, FILM COATED ORAL at 17:19

## 2023-12-01 RX ADMIN — AMLODIPINE BESYLATE 5 MG: 5 TABLET ORAL at 17:18

## 2023-12-01 RX ADMIN — ESCITALOPRAM OXALATE 20 MG: 10 TABLET ORAL at 17:18

## 2023-12-01 RX ADMIN — LISINOPRIL 40 MG: 20 TABLET ORAL at 09:03

## 2023-12-01 RX ADMIN — INFLUENZA A VIRUS A/VICTORIA/4897/2022 IVR-238 (H1N1) ANTIGEN (FORMALDEHYDE INACTIVATED), INFLUENZA A VIRUS A/DARWIN/9/2021 SAN-010 (H3N2) ANTIGEN (FORMALDEHYDE INACTIVATED), INFLUENZA B VIRUS B/PHUKET/3073/2013 ANTIGEN (FORMALDEHYDE INACTIVATED), AND INFLUENZA B VIRUS B/MICHIGAN/01/2021 ANTIGEN (FORMALDEHYDE INACTIVATED) 0.7 ML: 60; 60; 60; 60 INJECTION, SUSPENSION INTRAMUSCULAR at 06:24

## 2023-12-01 RX ADMIN — DOCUSATE SODIUM 50 MG AND SENNOSIDES 8.6 MG 2 TABLET: 8.6; 5 TABLET, FILM COATED ORAL at 17:19

## 2023-12-01 RX ADMIN — WARFARIN SODIUM 2.5 MG: 2.5 TABLET ORAL at 17:19

## 2023-12-01 SDOH — SOCIAL STABILITY - SOCIAL INSECURITY: OTHER SPECIFIED PROBLEMS RELATED TO PRIMARY SUPPORT GROUP: Z63.8

## 2023-12-01 ASSESSMENT — PATIENT HEALTH QUESTIONNAIRE - PHQ9
SUM OF ALL RESPONSES TO PHQ9 QUESTIONS 1 AND 2: 0
6. FEELING BAD ABOUT YOURSELF - OR THAT YOU ARE A FAILURE OR HAVE LET YOURSELF OR YOUR FAMILY DOWN: SEVERAL DAYS
1. LITTLE INTEREST OR PLEASURE IN DOING THINGS: NOT AT ALL
5. POOR APPETITE OR OVEREATING: SEVERAL DAYS
8. MOVING OR SPEAKING SO SLOWLY THAT OTHER PEOPLE COULD HAVE NOTICED. OR THE OPPOSITE, BEING SO FIGETY OR RESTLESS THAT YOU HAVE BEEN MOVING AROUND A LOT MORE THAN USUAL: SEVERAL DAYS
7. TROUBLE CONCENTRATING ON THINGS, SUCH AS READING THE NEWSPAPER OR WATCHING TELEVISION: SEVERAL DAYS
SUM OF ALL RESPONSES TO PHQ QUESTIONS 1-9: 7
2. FEELING DOWN, DEPRESSED, IRRITABLE, OR HOPELESS: NOT AT ALL
4. FEELING TIRED OR HAVING LITTLE ENERGY: SEVERAL DAYS
SUM OF ALL RESPONSES TO PHQ9 QUESTIONS 1 AND 2: 2
9. THOUGHTS THAT YOU WOULD BE BETTER OFF DEAD, OR OF HURTING YOURSELF: NOT AT ALL
1. LITTLE INTEREST OR PLEASURE IN DOING THINGS: SEVERAL DAYS
3. TROUBLE FALLING OR STAYING ASLEEP OR SLEEPING TOO MUCH: NOT AT ALL
2. FEELING DOWN, DEPRESSED, IRRITABLE, OR HOPELESS: SEVERAL DAYS

## 2023-12-01 ASSESSMENT — CHA2DS2 SCORE
CHF OR LEFT VENTRICULAR DYSFUNCTION: NO
AGE 75 OR GREATER: YES
VASCULAR DISEASE: NO
SEX: MALE
HYPERTENSION: YES
CHA2DS2 VASC SCORE: 4
DIABETES: YES
PRIOR STROKE OR TIA OR THROMBOEMBOLISM: NO
AGE 65 TO 74: NO

## 2023-12-01 ASSESSMENT — LIFESTYLE VARIABLES
TOTAL SCORE: 0
AVERAGE NUMBER OF DAYS PER WEEK YOU HAVE A DRINK CONTAINING ALCOHOL: 0
TOTAL SCORE: 0
ON A TYPICAL DAY WHEN YOU DRINK ALCOHOL HOW MANY DRINKS DO YOU HAVE: 0
DOES PATIENT WANT TO STOP DRINKING: NO
HAVE PEOPLE ANNOYED YOU BY CRITICIZING YOUR DRINKING: NO
EVER FELT BAD OR GUILTY ABOUT YOUR DRINKING: NO
CONSUMPTION TOTAL: NEGATIVE
HOW MANY TIMES IN THE PAST YEAR HAVE YOU HAD 5 OR MORE DRINKS IN A DAY: 0
EVER HAD A DRINK FIRST THING IN THE MORNING TO STEADY YOUR NERVES TO GET RID OF A HANGOVER: NO
TOTAL SCORE: 0
HAVE YOU EVER FELT YOU SHOULD CUT DOWN ON YOUR DRINKING: NO
ALCOHOL_USE: NO

## 2023-12-01 ASSESSMENT — COGNITIVE AND FUNCTIONAL STATUS - GENERAL
CLIMB 3 TO 5 STEPS WITH RAILING: A LITTLE
MOBILITY SCORE: 12
CLIMB 3 TO 5 STEPS WITH RAILING: A LOT
MOVING TO AND FROM BED TO CHAIR: A LOT
SUGGESTED CMS G CODE MODIFIER MOBILITY: CL
MOBILITY SCORE: 18
STANDING UP FROM CHAIR USING ARMS: A LITTLE
TOILETING: A LOT
MOVING FROM LYING ON BACK TO SITTING ON SIDE OF FLAT BED: A LITTLE
SUGGESTED CMS G CODE MODIFIER MOBILITY: CK
MOVING FROM LYING ON BACK TO SITTING ON SIDE OF FLAT BED: A LOT
WALKING IN HOSPITAL ROOM: A LOT
DRESSING REGULAR LOWER BODY CLOTHING: A LOT
DRESSING REGULAR UPPER BODY CLOTHING: A LITTLE
HELP NEEDED FOR BATHING: A LOT
SUGGESTED CMS G CODE MODIFIER DAILY ACTIVITY: CK
STANDING UP FROM CHAIR USING ARMS: A LOT
TURNING FROM BACK TO SIDE WHILE IN FLAT BAD: A LITTLE
MOVING TO AND FROM BED TO CHAIR: A LITTLE
DAILY ACTIVITIY SCORE: 17
WALKING IN HOSPITAL ROOM: A LITTLE
TURNING FROM BACK TO SIDE WHILE IN FLAT BAD: A LOT

## 2023-12-01 ASSESSMENT — PAIN DESCRIPTION - PAIN TYPE
TYPE: ACUTE PAIN

## 2023-12-01 ASSESSMENT — ENCOUNTER SYMPTOMS
BLURRED VISION: 0
MYALGIAS: 0
DIZZINESS: 0
COUGH: 0
HEADACHES: 0
PALPITATIONS: 0
SHORTNESS OF BREATH: 0
SORE THROAT: 0
NECK PAIN: 0
CHILLS: 0
DOUBLE VISION: 0
HEARTBURN: 0
FEVER: 0
NAUSEA: 0

## 2023-12-01 ASSESSMENT — GAIT ASSESSMENTS
DEVIATION: BRADYKINETIC
ASSISTIVE DEVICE: FRONT WHEEL WALKER
GAIT LEVEL OF ASSIST: SUPERVISED
DISTANCE (FEET): 175

## 2023-12-01 ASSESSMENT — FIBROSIS 4 INDEX: FIB4 SCORE: 1.4

## 2023-12-01 NOTE — ASSESSMENT & PLAN NOTE
Diagnosed at [facility], transferred here due to no inpatient beds  Mildly hypoxic requiring 2 L  Unasyn and azithromycin  Check procalcitonin  Pulmonary hygiene, I-S, RT  Follow cultures  Patient is on infliximab so somewhat immunocompromised    12/1 procal neg,change to augmentin

## 2023-12-01 NOTE — PROGRESS NOTES
Rec'd pt via gurney, escorted by EMT. Placed in bed. Assumed pt care. Assessment completed. AA&OX2, reoriented to time and event. Denies pain at this time. No s/s of discomfort or distress. Gurrola draining to gravity. Explained unit routine including call light use, bed alarm need, plan of care. Pt nodded understanding. Bed in lowest position, bed locked, bed alarm on for safety, treaded socks in place, RN and CNA numbers provided, call light within reach.

## 2023-12-01 NOTE — PROGRESS NOTES
Admitted this a.m. for respiratory failure, transferred [place] [facility].  Leukocytosis improving, patient reports recent fall with syncope.  For further work-up.  Patient has been seen by PT.  Patient to oral antibiotic, Pro-Jae is negative  Improving shortness of breath.    On exam, patient appears comfortable.  ANO x3.  Working well with PT.  Front wheel walker ordered.  Continue current management.

## 2023-12-01 NOTE — THERAPY
Physical Therapy   Initial Evaluation     Patient Name: Alexis Randall  Age:  83 y.o., Sex:  male  Medical Record #: [id number]  Today's Date: 12/1/2023     Precautions  Precautions: Fall Risk    Assessment  Patient is an 83 y.o. male with hx of HTN, paroxysmal A fib, DLD, COPD, and CKD, now admitted with SOB due to PNA, AMS, and recent GLF. CT head at [facility] showing no acute intracranial abnormalities following GLF with head strike. PT eval complete pt currently presents at his functional baseline and completed all mobility at SPV level with use of FWW. No unilateral deficits noted and pt reports feeling at his baseline. Anticipate that pt will be able to safely DC home with FWW and family support once medically cleared. Pt has no acute care PT needs and will not be actively followed for physical therapy services at this time, however may be seen if requested by physician for 1 more visit within 30 days to address any discharge or equipment needs.     Plan    Physical Therapy Initial Treatment Plan   Duration: Discharge Needs Only    DC Equipment Recommendations: Front-Wheel Walker (order initiated)  Discharge Recommendations: Recommend outpatient physical therapy services to address higher level deficits (higher level balance deficits)         Vitals   O2 (LPM) 2   O2 Delivery Device Silicone Nasal Cannula   Prior Living Situation   Prior Services None   Housing / Facility 1 Story House   Steps Into Home 0   Steps In Home 0   Equipment Owned Single Point Cane   Lives with - Patient's Self Care Capacity Spouse;Child Less than 18 Years of Age   Comments reports living with his wife and 2 grand dtrs   Prior Level of Functional Mobility   Bed Mobility Independent   Transfer Status Independent   Ambulation Independent   Ambulation Distance community distances   Assistive Devices Used Single Point Cane   Stairs Independent   History of Falls   History of Falls Yes   Date of Last Fall   (reports falling out of a truck this  week 11/27 with a 1 hour LOC)   Cognition    Cognition / Consciousness WDL   Level of Consciousness Alert   Comments pleasant and participatory, motivated to go home   Active ROM Lower Body    Active ROM Lower Body  WDL   Strength Lower Body   Lower Body Strength  WDL   Coordination Lower Body    Coordination Lower Body  WDL   Balance Assessment   Sitting Balance (Static) Good   Sitting Balance (Dynamic) Good   Standing Balance (Static) Fair +   Standing Balance (Dynamic) Fair +   Weight Shift Sitting Good   Weight Shift Standing Good   Comments FWW in standing   Bed Mobility    Supine to Sit Supervised   Sit to Supine Supervised   Scooting Supervised   Gait Analysis   Gait Level Of Assist Supervised   Assistive Device Front Wheel Walker   Distance (Feet) 175   # of Times Distance was Traveled 1   Deviation Bradykinetic   Weight Bearing Status no restrictions   Functional Mobility   Sit to Stand Supervised   Bed, Chair, Wheelchair Transfer Supervised   Toilet Transfers Supervised   Mobility FWW   How much difficulty does the patient currently have...   Turning over in bed (including adjusting bedclothes, sheets and blankets)? 3   Sitting down on and standing up from a chair with arms (e.g., wheelchair, bedside commode, etc.) 3   Moving from lying on back to sitting on the side of the bed? 3   How much help from another person does the patient currently need...   Moving to and from a bed to a chair (including a wheelchair)? 3   Need to walk in a hospital room? 3   Climbing 3-5 steps with a railing? 3   6 clicks Mobility Score 18   Education Group   Education Provided Role of Physical Therapist   Role of Physical Therapist Patient Response Patient;Acceptance;Explanation;Verbal Demonstration   Physical Therapy Initial Treatment Plan    Duration Discharge Needs Only   Problem List    Problems None   Anticipated Discharge Equipment and Recommendations   DC Equipment Recommendations Front-Wheel Walker  (order initiated)    Discharge Recommendations Recommend outpatient physical therapy services to address higher level deficits  (higher level balance deficits)   Interdisciplinary Plan of Care Collaboration   IDT Collaboration with  Nursing   Patient Position at End of Therapy Edge of Bed;Bed Alarm On;Call Light within Reach;Tray Table within Reach;Phone within Reach   Collaboration Comments RN updated   Session Information   Date / Session Number  12/1- 1x only (DC needs)

## 2023-12-01 NOTE — DISCHARGE PLANNING
1400 RN CM  spoke to pt's wife.  Pt is usually AOX4.  He has been getting more confused since the fall on Monday 11/27.  Pt lives with his wife in a 1 story home.  Pt has a BiPAP with [facility]. Prior to this hospitalization, pt was independent with ADLs. Pt's wife states that pt has a living will.      Case Management Discharge Planning    Admission Date: 12/1/2023  GMLOS: 2.9  ALOS: 1    6-Clicks ADL Score: 17  6-Clicks Mobility Score: 18  PT and/or OT Eval ordered: Yes  Post-acute Referrals Ordered: Yes  Post-acute Choice Obtained: Yes  Has referral(s) been sent to post-acute provider:  Yes      Anticipated Discharge Dispo: Discharge Disposition: Discharged to home/self care (01)    DME Needed: Yes    DME Ordered: Yes    Action(s) Taken: DC Assessment Complete (See below)    Escalations Completed: None    Medically Clear: No    Next Steps: RNCM to continue to follow up with pt and medical team to address dc needs and barriers      Barriers to Discharge: Medical clearance    Care Transition Team Assessment    Information Source  Orientation Level: Oriented to person  Information Given By: Spouse  Who is responsible for making decisions for patient? : Patient     Elopement Risk  Legal Hold: No  Ambulatory or Self Mobile in Wheelchair: Yes  Disoriented: No  Psychiatric Symptoms: None  History of Wandering: No  Elopement this Admit: No  Vocalizing Wanting to Leave: No  Displays Behaviors, Body Language Wanting to Leave: No-Not at Risk for Elopement  Elopement Risk: Not at Risk for Elopement    Interdisciplinary Discharge Planning  Lives with - Patient's Self Care Capacity: Spouse, Child Less than 18 Years of Age  Patient or legal guardian wants to designate a caregiver: Yes  Caregiver name: Breanne Randall  Caregiver contact info: [phone]  ([facility]) Authorization for Release of Health Information has been completed: Yes  Support Systems: Spouse / Significant Other, Children  Housing / Facility: 1 Story  House  Prior Services: None  Durable Medical Equipment: Home Oxygen  DME Provider / Phone: Kar    Discharge Preparedness  What is your plan after discharge?: Home with help  What are your discharge supports?: Spouse  Difficulity with ADLs: None  Difficulity with IADLs: None       Vision / Hearing Impairment  Vision Impairment : Yes  Right Eye Vision: Wears Glasses  Left Eye Vision: Wears Glasses  Hearing Impairment : Yes  Hearing Impairment: Both Ears  Does Pt Need Special Equipment for the Hearing Impaired?: Yes-Needs for Facility to Arrange Equipment for the Hearing Impaired         Domestic Abuse  Have you ever been the victim of abuse or violence?: No  Physical Abuse or Sexual Abuse: No  Verbal Abuse or Emotional Abuse: No  Possible Abuse/Neglect Reported to:: Not Applicable    Psychological Assessment  History of Substance Abuse: None     Anticipated Discharge Information  Discharge Disposition: Discharged to home/self care (01)

## 2023-12-01 NOTE — PROGRESS NOTES
83-year-old male presented to [facility] ER as TBI.  Wife reported patient has been confused for the past 2 days, patient had a GLF in shower.  Trauma survey negative, incidentally noted to have pneumonia on CT.  WBC 16, requiring 2 L oxygen.  [facility] at full capacity, therefore transfer being requested.

## 2023-12-01 NOTE — PROGRESS NOTES
Inpatient Anticoagulation Service Note for 2023    Reason for Anticoagulation: Atrial Fibrillation  Target INR: 2.0 to 3.0    [id number] VASc Score: 4  HAS-BLED Score: 2    Hemoglobin Value: (!) 10.6  Hematocrit Value: (!) 32.5  Lab Platelet Value: 230    INR from last 7 days       Date/Time INR Value    23 0550 2.74          Dose from last 7 days       Date/Time Dose (mg)    23 1207 2.5          Assessment:  - Chronic warfarin for hx of pAF, chadsvasc 4.  - [place] [facility] [place]. CT head neg for hemorrhage, started on ABX's for CAP.  - Follows w/ [facility] anticoagulation clinic, most recent home dosin mg Tues/Thurs, 2.5 mg AOD's.  - INR supratherapeutic last night at 3.55, however decreased abruptly into therapeutic range this morning at 2.74 (goal 2-3). Suspect supratherapeutic INR was likely caused by acute infx & NILESH. NILESH now resolved, however ongoing augmentin therapy for CAP may enhance warfarin's effect.  - CBC stable w/o overt bleeding.  - Started on diabetic diet this morning.    Plan:  - Given abrupt INR decrease overnight w/ anticipated continued downtrend, previous stability on home dosing, & no bleeding - resume home dosing (warfarin 5 mg Tues/Thurs, 2.5 mg all other days). Warfarin 2.5 mg tonight.  - Repeat INR & CBC tomorrow.    Pharmacist suggested discharge dosing:   Despite supratherapeutic INR on admission due to acute issues, home dosing will likely remain appropriate as INR's are noted to be therapeutic >75% of the time outpatient. Recommend INR check within 3-4 days of discharge.       Edmund Martell, PharmD

## 2023-12-01 NOTE — PROGRESS NOTES
Assumed care of pt from HS RN. He is resting in low air loss bed with eyes closed, respirations even. NC in place at 2L. Bed frame bed alarm on. Call light in reach and bed low and locked.

## 2023-12-01 NOTE — H&P
Hospital Medicine History & Physical Note    Date of Service  12/1/2023    Primary Care Physician  NIKI Forman.    Code Status  Full Code    Chief Complaint  No chief complaint on file.      History of Presenting Illness  Alexis Randall is a 83 y.o. male who presented 12/1/2023 with pneumonia.  Very pleasant 83-year-old man with history of hypertension, paroxysmal atrial fibrillation, dyslipidemia, COPD, CKD.  Presented to [facility] with confusion, some shortness of breath, ground-level fall and shower.  Trauma survey negative.  Found to have pneumonia with leukocytosis at 16 K requiring supplemental oxygen 2 L.  [facility] has no inpatient beds transferred to [facility].  At my evaluation patient has no acute complaints, feels a little confused but otherwise denies chest pain or shortness of breath at time of my evaluation.    I discussed the plan of care with patient.    Review of Systems  Review of Systems   Constitutional:  Negative for chills and fever.   HENT:  Positive for congestion. Negative for sore throat.    Eyes:  Negative for blurred vision and double vision.   Respiratory:  Negative for cough and shortness of breath.    Cardiovascular:  Negative for chest pain and palpitations.   Gastrointestinal:  Negative for heartburn and nausea.   Genitourinary:  Negative for dysuria and urgency.   Musculoskeletal:  Negative for myalgias and neck pain.   Neurological:  Negative for dizziness and headaches.       Past Medical History   has a past medical history of Arthritis, Chronic anticoagulation, Depression, Diabetes ([facility]), Dyslipidemia, Essential hypertension, Hypertension, Macular degeneration, and PAF (paroxysmal atrial fibrillation) ([facility]).    Surgical History   has a past surgical history that includes laminotomy (1984); eye surgery; and arthroplasty.     Family History  family history includes Heart Disease in his mother.   Family history reviewed with patient. There is no family history that is  pertinent to the chief complaint.     Social History   reports that he quit smoking about 39 years ago. His smoking use included cigarettes. He started smoking about 63 years ago. He has quit using smokeless tobacco. He reports current alcohol use. He reports that he does not use drugs.    Allergies  Allergies   Allergen Reactions    Remicade [Infliximab Injection] Anaphylaxis     Pt allergy to Remicade, takes pre treatment of Benadryl and SoluMedrol and is able to tolerate infusion.    Bee Venom Anaphylaxis     Carries epi pen       Medications  Prior to Admission Medications   Prescriptions Last Dose Informant Patient Reported? Taking?   Multiple Vitamins-Minerals (PRESERVISION AREDS 2) Cap   Yes No   Sig: Take  by mouth.   albuterol 108 (90 Base) MCG/ACT Aero Soln inhalation aerosol  Family Member No No   Sig: Inhale 2 Puffs by mouth every four hours as needed for Shortness of Breath.   amLODIPine (NORVASC) 5 MG Tab   No No   Sig: Take 1 Tablet by mouth every day.   atorvastatin (LIPITOR) 20 MG Tab   No No   Sig: Take 1 Tablet by mouth at bedtime.   celecoxib (CELEBREX) 200 MG Cap   Yes No   Sig: Take 1 Capsule by mouth 2 times a day.   escitalopram (LEXAPRO) 20 MG tablet   No No   Sig: Take 1 Tablet by mouth every day.   finasteride (PROSCAR) 5 MG Tab   Yes No   guaiFENesin ER (MUCINEX) 600 MG TABLET SR 12 HR   No No   Sig: Take 1 Tablet by mouth every 12 hours.   hydroCHLOROthiazide 12.5 MG tablet   No No   Sig: Take 1 Tablet by mouth every day.   lisinopril (PRINIVIL) 40 MG tablet   No No   Sig: Take 1 Tablet by mouth every evening.   metFORMIN ER (GLUCOPHAGE XR) 750 MG TABLET SR 24 HR   No No   Sig: Take 1 Tablet by mouth 2 times a day.   methylPREDNISolone (MEDROL) 4 MG Tab   Yes No   Si mg.   metoprolol tartrate (LOPRESSOR) 25 MG Tab   No No   Sig: Take 0.5 Tablets by mouth 2 times a day.   traMADol (ULTRAM) 50 MG Tab   Yes No   Si tablet as needed oral 4 times a day for 30 days   warfarin  (COUMADIN) 5 MG Tab   No No   Sig: TAKE 1/2 TO 1 TABLET BY  MOUTH DAILY OR AS DIRECTED  BY COUMADIN CLINIC   Patient taking differently: Take 2.5 mg by mouth. TAKE 1/2 TO 1 TABLET BY  MOUTH DAILY OR AS DIRECTED  BY COUMADIN CLINIC    2.5 mg on Sun /Monday/ /WEdnesday /Friday / Saturday      Facility-Administered Medications: None       Physical Exam  Temp:  [36.5 °C (97.7 °F)-37.4 °C (99.3 °F)] 36.5 °C (97.7 °F)  Pulse:  [70-92] 76  Resp:  [14-25] 17  BP: (147-185)/() 147/73  SpO2:  [87 %-94 %] 91 %  Blood Pressure : (!) 147/73   Temperature: 36.5 °C (97.7 °F)   Pulse: 76   Respiration: 17   Pulse Oximetry: 91 %       Physical Exam  Constitutional:       General: He is not in acute distress.     Appearance: He is not toxic-appearing.   HENT:      Head: Normocephalic and atraumatic.      Nose: Nose normal.      Mouth/Throat:      Mouth: Mucous membranes are dry.      Pharynx: Oropharynx is clear.   Eyes:      Extraocular Movements: Extraocular movements intact.      Conjunctiva/sclera: Conjunctivae normal.   Cardiovascular:      Rate and Rhythm: Normal rate and regular rhythm.      Pulses: Normal pulses.      Heart sounds: Normal heart sounds.   Pulmonary:      Effort: Pulmonary effort is normal.      Breath sounds: Normal breath sounds.   Abdominal:      General: Bowel sounds are normal. There is no distension.      Palpations: Abdomen is soft.   Musculoskeletal:         General: No swelling or deformity.      Cervical back: Neck supple. No rigidity.   Skin:     General: Skin is warm and dry.   Neurological:      General: No focal deficit present.      Cranial Nerves: No cranial nerve deficit.         Laboratory:  Recent Labs     11/30/23  2225   WBC 13.9*   RBC 4.12*   HEMOGLOBIN 11.7*   HEMATOCRIT 36.4*   MCV 88.3   MCH 28.4   MCHC 32.1*   RDW 14.2   PLATELETCT 242   MPV 9.8     Recent Labs     11/30/23  2225   SODIUM 138   POTASSIUM 4.2   CHLORIDE 104   CO2 24   GLUCOSE 159*   BUN 24*   CREATININE 1.5*  "  CALCIUM 9.0     Recent Labs     11/30/23 2225   ALTSGPT 23   ASTSGOT 20   ALKPHOSPHAT 68   TBILIRUBIN 0.5   GLUCOSE 159*     Recent Labs     11/30/23 2225   APTT 43.9*   INR 3.55     No results for input(s): \"NTPROBNP\" in the last 72 hours.      No results for input(s): \"TROPONINT\" in the last 72 hours.    Imaging:  DX-CHEST-LIMITED (1 VIEW)   Final Result         1.  Left basilar atelectasis or early infiltrate   2.  Atherosclerosis          Assessment/Plan:  Justification for Admission Status  I anticipate this patient will require at least two midnights for appropriate medical management, necessitating inpatient admission because community-acquired pneumonia requiring additional oxygen and IV antibiotics    Patient will need a Med/Surg bed on MEDICAL service .  The need is secondary to above.    * Pneumonia due to infectious organism- (present on admission)  Assessment & Plan  Diagnosed at [facility], transferred here due to no inpatient beds  Mildly hypoxic requiring 2 L  Unasyn and azithromycin  Check procalcitonin  Pulmonary hygiene, I-S, RT  Follow cultures  Patient is on infliximab so somewhat immunocompromised    Acute encephalopathy  Assessment & Plan  In setting of community-acquired pneumonia  Consider most likely secondary to acute infection however will check TSH, B12, thiamine, ammonia    Essential hypertension- (present on admission)  Assessment & Plan  Continue amlodipine, hydrochlorothiazide, lisinopril    PAF (paroxysmal atrial fibrillation) (HCC)- (present on admission)  Assessment & Plan  Rate controlled  Continue warfarin        VTE prophylaxis: SCDs/TEDs and therapeutic anticoagulation with warfarin  "

## 2023-12-01 NOTE — ASSESSMENT & PLAN NOTE
11/30 In setting of community-acquired pneumonia  Consider most likely secondary to acute infection however will check TSH, B12, thiamine, ammonia    12/1 with recent syncope and fall  For PT OT evaluation  Patient reports fall on Monday or Tuesday with loss of consciousness for 1 hour after hitting his head  On examination patient is alert and oriented x3, he is forgetful  We will follow-up labs  Appears to be near baseline

## 2023-12-01 NOTE — CARE PLAN
The patient is Stable - Low risk of patient condition declining or worsening    Shift Goals  Clinical Goals: Pt will not attempt to get out of bed on his own, will use call light correctly  Patient Goals: go home    Progress made toward(s) clinical / shift goals:      Patient is not progressing towards the following goals:

## 2023-12-01 NOTE — PROGRESS NOTES
4 Eyes Skin Assessment Completed by LESIA Diego and LESIA Orantes.    Head WDL  Ears WDL  Nose WDL  Mouth WDL  Neck WDL  Breast/Chest WDL  Shoulder Blades WDL  Spine WDL  (R) Arm/Elbow/Hand Laceration, bleeding, dressing applied  (L) Arm/Elbow/Hand WDL  Abdomen WDL  Groin WDL  Scrotum/Coccyx/Buttocks Sacrum is red and blanching  (R) Leg WDL  (L) Leg WDL  (R) Heel/Foot/Toe heel has a crack/fissure  (L) Heel/Foot/Toe heel has a crack/fissure          Devices In Places [place] and Nasal Cannula      Interventions In Place NC W/Ear Foams, Pillows, Elbow Mepilex, and Low Air Loss Mattress    Possible Skin Injury Yes    Pictures Uploaded Into Epic Yes  Wound Consult Placed Yes  RN Wound Prevention Protocol Ordered Yes

## 2023-12-01 NOTE — DIETARY
Nutrition services: Day 0 of admit.  Alexis Randall is a 83 y.o. male with admitting DX of Pneumonia due to infectious organism.  Consult received for Malnutrition Screening Tool score of 4 for unplanned weight loss of 24-33 lb x 3 months and poor appetite.    Spoke with pt at bedside. Breakfast tray at bedside and observed he ate ~50%. Pt states his appetite at home is good. He denies unintentional weight loss.     Assessment:  Weight: 94 kg (207 lb 3.7 oz)  Body mass index is 33.46 kg/m²., BMI classification: Class 1 Obesity  Diet/Intake: Consistent CHO    Evaluation:   Per chart review, weight has increased over the past year.  Skin: laceration to right elbow.  Labs and medications reviewed.  No significant fat or muscle loss noted.    Malnutrition Risk: ASPEN criteria not met.    Recommendations/Plan:   Encourage intake of meals >50%.  Document intake of all meals as % taken in ADL's to provide interdisciplinary communication across all shifts.   Monitor weight.  Nutrition rep will continue to see patient for ongoing meal and snack preferences.   RD to monitor per department policy.

## 2023-12-02 VITALS
BODY MASS INDEX: 33.46 KG/M2 | WEIGHT: 207.23 LBS | RESPIRATION RATE: 16 BRPM | SYSTOLIC BLOOD PRESSURE: 166 MMHG | TEMPERATURE: 98.5 F | HEART RATE: 75 BPM | OXYGEN SATURATION: 95 % | DIASTOLIC BLOOD PRESSURE: 76 MMHG

## 2023-12-02 LAB
ANION GAP SERPL CALC-SCNC: 12 MMOL/L (ref 7–16)
BASOPHILS # BLD AUTO: 0.4 % (ref 0–1.8)
BASOPHILS # BLD: 0.04 K/UL (ref 0–0.12)
BUN SERPL-MCNC: 17 MG/DL (ref 8–22)
CALCIUM SERPL-MCNC: 9.3 MG/DL (ref 8.5–10.5)
CHLORIDE SERPL-SCNC: 104 MMOL/L (ref 96–112)
CO2 SERPL-SCNC: 22 MMOL/L (ref 20–33)
CREAT SERPL-MCNC: 1.14 MG/DL (ref 0.5–1.4)
EOSINOPHIL # BLD AUTO: 0.29 K/UL (ref 0–0.51)
EOSINOPHIL NFR BLD: 3.1 % (ref 0–6.9)
ERYTHROCYTE [DISTWIDTH] IN BLOOD BY AUTOMATED COUNT: 45.5 FL (ref 35.9–50)
GFR SERPLBLD CREATININE-BSD FMLA CKD-EPI: 65 ML/MIN/1.73 M 2
GLUCOSE SERPL-MCNC: 154 MG/DL (ref 65–99)
HCT VFR BLD AUTO: 36.1 % (ref 42–52)
HGB BLD-MCNC: 11.1 G/DL (ref 14–18)
IMM GRANULOCYTES # BLD AUTO: 0.04 K/UL (ref 0–0.11)
IMM GRANULOCYTES NFR BLD AUTO: 0.4 % (ref 0–0.9)
INR PPP: 2.32 (ref 0.87–1.13)
LYMPHOCYTES # BLD AUTO: 1.45 K/UL (ref 1–4.8)
LYMPHOCYTES NFR BLD: 15.6 % (ref 22–41)
MCH RBC QN AUTO: 27.5 PG (ref 27–33)
MCHC RBC AUTO-ENTMCNC: 30.7 G/DL (ref 32.3–36.5)
MCV RBC AUTO: 89.4 FL (ref 81.4–97.8)
MONOCYTES # BLD AUTO: 1.04 K/UL (ref 0–0.85)
MONOCYTES NFR BLD AUTO: 11.2 % (ref 0–13.4)
NEUTROPHILS # BLD AUTO: 6.46 K/UL (ref 1.82–7.42)
NEUTROPHILS NFR BLD: 69.3 % (ref 44–72)
NRBC # BLD AUTO: 0 K/UL
NRBC BLD-RTO: 0 /100 WBC (ref 0–0.2)
PLATELET # BLD AUTO: 224 K/UL (ref 164–446)
PMV BLD AUTO: 9.9 FL (ref 9–12.9)
POTASSIUM SERPL-SCNC: 4 MMOL/L (ref 3.6–5.5)
PROTHROMBIN TIME: 25.8 SEC (ref 12–14.6)
RBC # BLD AUTO: 4.04 M/UL (ref 4.7–6.1)
SODIUM SERPL-SCNC: 138 MMOL/L (ref 135–145)
WBC # BLD AUTO: 9.3 K/UL (ref 4.8–10.8)

## 2023-12-02 PROCEDURE — 80048 BASIC METABOLIC PNL TOTAL CA: CPT

## 2023-12-02 PROCEDURE — 85025 COMPLETE CBC W/AUTO DIFF WBC: CPT

## 2023-12-02 PROCEDURE — 700102 HCHG RX REV CODE 250 W/ 637 OVERRIDE(OP): Performed by: STUDENT IN AN ORGANIZED HEALTH CARE EDUCATION/TRAINING PROGRAM

## 2023-12-02 PROCEDURE — A9270 NON-COVERED ITEM OR SERVICE: HCPCS | Performed by: INTERNAL MEDICINE

## 2023-12-02 PROCEDURE — 94669 MECHANICAL CHEST WALL OSCILL: CPT

## 2023-12-02 PROCEDURE — 36415 COLL VENOUS BLD VENIPUNCTURE: CPT

## 2023-12-02 PROCEDURE — A9270 NON-COVERED ITEM OR SERVICE: HCPCS | Performed by: STUDENT IN AN ORGANIZED HEALTH CARE EDUCATION/TRAINING PROGRAM

## 2023-12-02 PROCEDURE — 94660 CPAP INITIATION&MGMT: CPT

## 2023-12-02 PROCEDURE — 99239 HOSP IP/OBS DSCHRG MGMT >30: CPT | Performed by: STUDENT IN AN ORGANIZED HEALTH CARE EDUCATION/TRAINING PROGRAM

## 2023-12-02 PROCEDURE — 85610 PROTHROMBIN TIME: CPT

## 2023-12-02 PROCEDURE — 700102 HCHG RX REV CODE 250 W/ 637 OVERRIDE(OP): Performed by: INTERNAL MEDICINE

## 2023-12-02 PROCEDURE — 94760 N-INVAS EAR/PLS OXIMETRY 1: CPT

## 2023-12-02 RX ORDER — ACETAMINOPHEN 325 MG/1
650 TABLET ORAL EVERY 4 HOURS PRN
Qty: 30 TABLET | Refills: 0 | Status: SHIPPED | OUTPATIENT
Start: 2023-12-02

## 2023-12-02 RX ORDER — ACETAMINOPHEN 325 MG/1
650 TABLET ORAL EVERY 4 HOURS PRN
Status: DISCONTINUED | OUTPATIENT
Start: 2023-12-02 | End: 2023-12-02 | Stop reason: HOSPADM

## 2023-12-02 RX ORDER — AMOXICILLIN AND CLAVULANATE POTASSIUM 875; 125 MG/1; MG/1
1 TABLET, FILM COATED ORAL EVERY 12 HOURS
Qty: 8 TABLET | Refills: 0 | Status: ACTIVE | OUTPATIENT
Start: 2023-12-02 | End: 2023-12-06

## 2023-12-02 RX ORDER — AMOXICILLIN AND CLAVULANATE POTASSIUM 875; 125 MG/1; MG/1
1 TABLET, FILM COATED ORAL EVERY 12 HOURS
Qty: 8 TABLET | Refills: 0 | Status: SHIPPED | OUTPATIENT
Start: 2023-12-02 | End: 2023-12-02 | Stop reason: SDUPTHER

## 2023-12-02 RX ADMIN — HYDROCHLOROTHIAZIDE 12.5 MG: 25 TABLET ORAL at 05:20

## 2023-12-02 RX ADMIN — METOPROLOL TARTRATE 12.5 MG: 25 TABLET, FILM COATED ORAL at 05:19

## 2023-12-02 RX ADMIN — LISINOPRIL 40 MG: 20 TABLET ORAL at 05:20

## 2023-12-02 RX ADMIN — ACETAMINOPHEN 650 MG: 325 TABLET, FILM COATED ORAL at 09:25

## 2023-12-02 RX ADMIN — AMOXICILLIN AND CLAVULANATE POTASSIUM 1 TABLET: 875; 125 TABLET, FILM COATED ORAL at 05:19

## 2023-12-02 RX ADMIN — DOCUSATE SODIUM 50 MG AND SENNOSIDES 8.6 MG 2 TABLET: 8.6; 5 TABLET, FILM COATED ORAL at 05:19

## 2023-12-02 ASSESSMENT — PAIN DESCRIPTION - PAIN TYPE
TYPE: ACUTE PAIN

## 2023-12-02 NOTE — CARE PLAN
The patient is Stable - Low risk of patient condition declining or worsening    Shift Goals  Clinical Goals: Safety from  falls, pain management  Patient Goals: Home    Progress made toward(s) clinical / shift goals:          Problem: Knowledge Deficit - Standard  Goal: Patient and family/care givers will demonstrate understanding of plan of care, disease process/condition, diagnostic tests and medications  Description: Target End Date:  1-3 days or as soon as patient condition allows    Document in Patient Education    1.  Patient and family/caregiver oriented to unit, equipment, visitation policy and means for communicating concern  2.  Complete/review Learning Assessment  3.  Assess knowledge level of disease process/condition, treatment plan, diagnostic tests and medications  4.  Explain disease process/condition, treatment plan, diagnostic tests and medications  Outcome: Progressing

## 2023-12-02 NOTE — PROGRESS NOTES
Assumed care of pt from HS RN. He is resting in bed with eyes closed, respirations even. Bed frame alarm on, low air loss bed. Call light in reach and bed low and locked.

## 2023-12-02 NOTE — DISCHARGE PLANNING
Case Management Discharge Planning    Admission Date: 12/1/2023  GMLOS: 2.9  ALOS: 1    6-Clicks ADL Score: 17  6-Clicks Mobility Score: 18  PT and/or OT Eval ordered: Yes  Post-acute Referrals Ordered: Yes  Post-acute Choice Obtained: Yes  Has referral(s) been sent to post-acute provider:  Yes      Anticipated Discharge Dispo: Discharge Disposition: Discharged to home/self care (01)    DME Needed: Yes    DME Ordered: Yes    Action(s) Taken: Choice obtained    2440 Choice form completed for FWW from [facility] continuity of care and faxed to [facility].   RN CM reached out to Charge RN to request walker through traction.    Escalations Completed: None    Medically Clear: Yes    Next Steps: RNCM to continue to follow up with pt and medical team to address dc needs and barriers      Barriers to Discharge: None

## 2023-12-02 NOTE — PROGRESS NOTES
Inpatient Anticoagulation Service Note for 12/2/2023    Reason for Anticoagulation: Atrial Fibrillation   Target INR: 2.0 to 3.0    [id number] VASc Score: 4  HAS-BLED Score: 2    Hemoglobin Value: (!) 11.1  Hematocrit Value: (!) 36.1  Lab Platelet Value: 224    INR from last 7 days       Date/Time INR Value    12/02/23 0659 2.32    12/01/23 0550 2.74     Dose from last 7 days       Date/Time Dose (mg)    12/02/23  2.5    12/01/23  2.5     Assessment:   Home Dose: Per Med Rec, warfarin 5 mg po on Tue, Thurs and warfarin 2.5 mg on all other days.   Time in therapeutic range outpatient: 75% (reported by [facility] 6 months ago)   Established interaction (home meds): Lexapro   New/Acute potential interactions: Augmentin through 12/8/23   Bridge Therapy: N/a   Reversal Agent Administered: N/a   Inpatient Diet: Consistent CHO   CBC: stable within acceptable parameters.    INR: Therapeutic     Plan: Continue usual home dosing regimen. INR tomorrow, if remains stable will reduce frequency of draws.      Kasandra Newman, PharmD

## 2023-12-02 NOTE — DISCHARGE PLANNING
Received choice form @: [id number]  Agency/Facility name: [facility] referral per choice form @: [phone]

## 2023-12-02 NOTE — DISCHARGE SUMMARY
Discharge Summary    CHIEF COMPLAINT ON ADMISSION  No chief complaint on file.      Reason for Admission  Pneumonia     Admission Date  12/1/2023    CODE STATUS  Full Code    HPI & HOSPITAL COURSE  Alexis Randall is a 83 y.o. male with a past medical history of hypertension, paroxysmal atrial fibrillation, dyslipidemia, COPD and CKD who presented to outside facility with confusion, shortness of breath and ground-level fall while in the shower.  He had a trauma survey done at outside facility which was negative.  He did have an elevated white blood cell count of 16,000 and a possible lower lobe infiltrate concerning for pneumonia he was started on antibiotics and transferred to [facility] as outside facility has no inpatient beds.    Patient WBC improved he has been, weaned to room air.  IV antibiotics have been changed to oral Augmentin to complete a total 5-day course.  He is alert and oriented x 4 on my evaluation, CT head was negative for acute pathology.  He was seen by therapy and able to mobilize independently front wheel walker was recommended which was ordered.  Outpatient therapy was recommended which was ordered.    At this time patient is hemodynamically stable he is saturating well on room air he is on oral Augmentin for commune acquired pneumonia.  He is alert and oriented x 4 and mobilizing independently and thus stable for discharge home    Therefore, he is discharged in fair and stable condition to home with close outpatient follow-up.    The patient met 2-midnight criteria for an inpatient stay at the time of discharge.    Discharge Date  12/2/2023    FOLLOW UP ITEMS POST DISCHARGE  Chronic medical conditions  Resolution of any respiratory symptoms and or confusion    DISCHARGE DIAGNOSES  Principal Problem:    Pneumonia due to infectious organism (POA: Yes)  Active Problems:    PAF (paroxysmal atrial fibrillation) (HCC) (Chronic) (POA: Yes)    Essential hypertension (Chronic) (POA: Yes)    CKD (chronic  kidney disease) stage 3, GFR 30-59 ml/min ([facility]) (POA: Yes)    Acute encephalopathy (POA: Unknown)  Resolved Problems:    * No resolved hospital problems. *      FOLLOW UP  No future appointments.  Fadumo Loya, EVINRFerminN.  [address]  [facility] [id number] [phone]    Call in 1 week(s)        MEDICATIONS ON DISCHARGE     Medication List        START taking these medications        Instructions   acetaminophen 325 MG Tabs  Commonly known as: Tylenol   Take 2 Tablets by mouth every four hours as needed for Mild Pain or Moderate Pain.  Dose: 650 mg     amoxicillin-clavulanate 875-125 MG Tabs  Commonly known as: Augmentin   Take 1 Tablet by mouth every 12 hours for 4 days.  Dose: 1 Tablet            CHANGE how you take these medications        Instructions   warfarin 5 MG Tabs  What changed: See the new instructions.  Commonly known as: Coumadin   Doctor's comments: Needs INR for additional refills  TAKE 1/2 TO 1 TABLET BY  MOUTH DAILY OR AS DIRECTED  BY COUMADIN CLINIC            CONTINUE taking these medications        Instructions   albuterol 108 (90 Base) MCG/ACT Aers inhalation aerosol   Inhale 2 Puffs by mouth every four hours as needed for Shortness of Breath.  Dose: 2 Puff     amLODIPine 5 MG Tabs  Commonly known as: Norvasc   Take 1 Tablet by mouth every day.  Dose: 5 mg     atorvastatin 20 MG Tabs  Commonly known as: Lipitor   Doctor's comments: Needs annual exam before more refills.  Take 1 Tablet by mouth at bedtime.  Dose: 20 mg     celecoxib 200 MG Caps  Commonly known as: CeleBREX   Take 1 Capsule by mouth 2 times a day.  Dose: 200 mg     escitalopram 20 MG tablet  Commonly known as: Lexapro   Doctor's comments: Due for annual exam.  Take 1 Tablet by mouth every day.  Dose: 20 mg     finasteride 5 MG Tabs  Commonly known as: Proscar      guaiFENesin  MG Tb12  Commonly known as: Mucinex   Take 1 Tablet by mouth every 12 hours.  Dose: 600 mg     hydroCHLOROthiazide 12.5 MG  tablet   Take 1 Tablet by mouth every day.  Dose: 12.5 mg     lisinopril 40 MG tablet  Commonly known as: Prinivil   Take 1 Tablet by mouth every evening.  Dose: 40 mg     metFORMIN  MG Tb24  Commonly known as: Glucophage XR   Doctor's comments: Requesting 1 year supply  Take 1 Tablet by mouth 2 times a day.  Dose: 750 mg     methylPREDNISolone 4 MG Tabs  Commonly known as: Medrol   2 mg.  Dose: 2 mg     metoprolol tartrate 25 MG Tabs  Commonly known as: Lopressor   Take 0.5 Tablets by mouth 2 times a day.  Dose: 12.5 mg     PreserVision AREDS 2 Caps   Take  by mouth.     traMADol 50 MG Tabs  Commonly known as: Ultram   1 tablet as needed oral 4 times a day for 30 days              Allergies  Allergies   Allergen Reactions    Remicade [Infliximab Injection] Anaphylaxis     Pt allergy to Remicade, takes pre treatment of Benadryl and SoluMedrol and is able to tolerate infusion.    Bee Venom Anaphylaxis     Carries epi pen       DIET  Orders Placed This Encounter   Procedures    Diet Order Diet: Consistent CHO (Diabetic)     Standing Status:   Standing     Number of Occurrences:   1     Order Specific Question:   Diet:     Answer:   Consistent CHO (Diabetic) [4]       ACTIVITY  As tolerated.      CONSULTATIONS  N/A    PROCEDURES  N/A    LABORATORY  Lab Results   Component Value Date    SODIUM 138 12/02/2023    POTASSIUM 4.0 12/02/2023    CHLORIDE 104 12/02/2023    CO2 22 12/02/2023    GLUCOSE 154 (H) 12/02/2023    BUN 17 12/02/2023    CREATININE 1.14 12/02/2023        Lab Results   Component Value Date    WBC 9.3 12/02/2023    HEMOGLOBIN 11.1 (L) 12/02/2023    HEMATOCRIT 36.1 (L) 12/02/2023    PLATELETCT 224 12/02/2023        Total time of the discharge process exceeds 51 minutes.

## 2023-12-02 NOTE — CARE PLAN
The patient is Stable - Low risk of patient condition declining or worsening    Shift Goals  Clinical Goals: saftey from falling  Patient Goals: home    Progress made toward(s) clinical / shift goals:  pt remains free from falls so far during this shift. Bed alarm remains on. Pt still confused. Reoriented.     Patient is not progressing towards the following goals:

## 2023-12-02 NOTE — PROGRESS NOTES
"Patient was set on CPAP machine about 0130 hr, but about 0220 hr, patient pull off the mask and he said that \"he does not like it\".  O2 sat of 95% at room air while he is awake.  "

## 2023-12-05 LAB — VIT B1 BLD-MCNC: 94 NMOL/L (ref 70–180)

## 2023-12-06 LAB
BACTERIA BLD CULT: NORMAL
BACTERIA BLD CULT: NORMAL
SIGNIFICANT IND 70042: NORMAL
SIGNIFICANT IND 70042: NORMAL
SITE SITE: NORMAL
SITE SITE: NORMAL
SOURCE SOURCE: NORMAL
SOURCE SOURCE: NORMAL

## 2024-02-02 DIAGNOSIS — I48.0 PAF (PAROXYSMAL ATRIAL FIBRILLATION) (HCC): ICD-10-CM

## 2024-02-02 DIAGNOSIS — Z79.01 CHRONIC ANTICOAGULATION: ICD-10-CM

## 2024-02-02 RX ORDER — WARFARIN SODIUM 5 MG/1
TABLET ORAL
Qty: 90 TABLET | Refills: 0 | OUTPATIENT
Start: 2024-02-02

## 2024-02-02 RX ORDER — WARFARIN SODIUM 5 MG/1
TABLET ORAL
Qty: 30 TABLET | Refills: 0 | Status: SHIPPED | OUTPATIENT
Start: 2024-02-02 | End: 2024-03-06

## 2024-02-02 NOTE — PROGRESS NOTES
Received refill request for warfarin.  Patient has not had INR since 4/2023.  He states he will go to lab ASAP.    He has also not seen [facility] in >two years.  Explained that to continue to be follow by our services, he would need to reestablish with cardiology office.  He voices understanding of this plan.  [employer] sent with information as well.  Yovanny Morgan, PharmD, BCACP

## 2024-02-02 NOTE — TELEPHONE ENCOUNTER
[facility] MED    Received request via: Patient    Was the patient seen in the last year in this department? Yes    Does the patient have an active prescription (recently filled or refills available) for medication(s) requested? No    Pharmacy Name:     [facility] [address] [place] [address] - [facility] BILL CALVO [[id number]]     Does the patient have [facility] Plus and need 100 day supply (blood pressure, diabetes and cholesterol meds only)? Patient does not have SCP

## 2024-02-05 ENCOUNTER — TELEPHONE (OUTPATIENT)
Dept: VASCULAR LAB | Facility: MEDICAL CENTER | Age: 84
End: 2024-02-05
Payer: MEDICARE

## 2024-02-05 ENCOUNTER — ANTICOAGULATION MONITORING (OUTPATIENT)
Dept: MEDICAL GROUP | Facility: PHYSICIAN GROUP | Age: 84
End: 2024-02-05
Payer: MEDICARE

## 2024-02-05 DIAGNOSIS — I48.0 PAF (PAROXYSMAL ATRIAL FIBRILLATION) (HCC): Chronic | ICD-10-CM

## 2024-02-06 NOTE — PROGRESS NOTES
Anticoagulation Summary  As of 2024      INR goal:  2.0-3.0   TTR:  51.5 % (1.7 y)   INR used for dosin.01 (2024)   Warfarin maintenance plan:  5 mg (5 mg x 1) every e, Thu; 2.5 mg (5 mg x 0.5) all other days   Weekly warfarin total:  22.5 mg   Plan last modified:  Audra Lei PharmD (2023)   Next INR check:  3/4/2024   Target end date:  Indefinite    Indications    PAF (paroxysmal atrial fibrillation) (HCC) [I48.0]  Chronic anticoagulation (Resolved) [Z79.01]                 Anticoagulation Episode Summary       INR check location:  Anticoagulation Clinic    Preferred lab:      Send INR reminders to:      Comments:            Anticoagulation Care Providers       Provider Role Specialty Phone number    Lauri العلي M.D. Referring Cardiovascular Disease (Cardiology) [phone]    [facility]agulation [facility] Responsible  [phone]            Refer to Anticoagulation Patient Findings for HPI  Patient Findings       Positives:  Emergency department visit (Multiple - d/t bug bites, GLF's (no ICH), PNA), Hospital admission    Negatives:  Signs/symptoms of thrombosis, Signs/symptoms of bleeding, Laboratory test error suspected, Change in health, Change in alcohol use, Change in activity, Upcoming invasive procedure, Upcoming dental procedure, Missed doses, Extra doses, Change in medications, Change in diet/appetite, Bruising, Other complaints            Spoke with patient pt wife to report a therapeutic INR.      Pt instructed to continue with current warfarin dosing regimen, confirms dosing.     Will follow up in 4 week(s).     Rohith Goodman, PharmD, BCACP

## 2024-02-06 NOTE — TELEPHONE ENCOUNTER
[facility] Scheduling Request  PT Name: Alexis Randall     PT MRN: [id number]    Best Call Back Number: [phone]      Any special instructions: Patient scheduling to be seen. Would like to see Fadumo Sam if available.    Thank you,  Jada AGUILAR

## 2024-02-06 NOTE — TELEPHONE ENCOUNTER
Pt calling regarding INR telephone management.     [facility] will only manage INR via telephone if pt has [facility] PCP or follows w/ [facility].    This pt's ins is not accepted by [facility].    He will check w/ his PCP to see if she can manage his INR given the above.    Rohith Goodman, PatriciaD, BCACP

## 2024-03-06 DIAGNOSIS — I48.0 PAF (PAROXYSMAL ATRIAL FIBRILLATION) (HCC): ICD-10-CM

## 2024-03-06 RX ORDER — WARFARIN SODIUM 5 MG/1
TABLET ORAL
Qty: 100 TABLET | Refills: 0 | Status: SHIPPED | OUTPATIENT
Start: 2024-03-06

## 2024-06-04 DIAGNOSIS — Z79.01 CHRONIC ANTICOAGULATION: ICD-10-CM

## 2024-06-17 ENCOUNTER — ANTICOAGULATION MONITORING (OUTPATIENT)
Dept: MEDICAL GROUP | Facility: PHYSICIAN GROUP | Age: 84
End: 2024-06-17
Payer: MEDICARE

## 2024-06-17 DIAGNOSIS — I48.0 PAF (PAROXYSMAL ATRIAL FIBRILLATION) (HCC): Chronic | ICD-10-CM

## 2024-06-18 NOTE — PROGRESS NOTES
Anticoagulation Summary  As of 2024      INR goal:  2.0-3.0   TTR:  59.7% (2.1 y)   INR used for dosin.22 (2024)   Warfarin maintenance plan:  5 mg (5 mg x 1) every Tue, Thu; 2.5 mg (5 mg x 0.5) all other days   Weekly warfarin total:  22.5 mg   Plan last modified:  Patricia SueD (2023)   Next INR check:  2024   Target end date:  Indefinite    Indications    PAF (paroxysmal atrial fibrillation) (HCC) [I48.0]  Chronic anticoagulation (Resolved) [Z79.01]                 Anticoagulation Episode Summary       INR check location:  Anticoagulation Clinic    Preferred lab:      Send INR reminders to:      Comments:            Anticoagulation Care Providers       Provider Role Specialty Phone number    Lauri العلي M.D. Referring Cardiovascular Disease (Cardiology) [phone]    [facility] Responsible  [phone]          Anticoagulation Patient Findings      Left voicemail message to report a therapeutic INR of 2.22.    Will have pt continue on with current warfarin dosing regimen.   Requested pt contact the clinic for any s/s of unusual bleeding, bruising, clotting or any changes to diet or medication.    FU INR in 4 week(s).    Of note, pt overdue for [facility] f/u - needs to r/s f/u visit ASAP or will be Dc'd from [facility] telephone management.    Rohith Goodman, PatriciaD, BCACP

## 2024-06-24 ENCOUNTER — ANTICOAGULATION MONITORING (OUTPATIENT)
Dept: VASCULAR LAB | Facility: MEDICAL CENTER | Age: 84
End: 2024-06-24
Payer: MEDICARE

## 2024-06-24 DIAGNOSIS — I48.0 PAF (PAROXYSMAL ATRIAL FIBRILLATION) (HCC): Chronic | ICD-10-CM

## 2024-06-24 NOTE — LETTER
June 26, 2024        Alexis Randall  [address]   [place] [facility] [address]        Dear Alexis:    We have been trying to reach you to discuss your INR result from 06/21/24 but have been unable to reach you. Please call our office at [phone] to discuss your warfarin dosing.      Sincerely,        Audra Lei, PharmD  [facility]    Electronically Signed

## 2024-06-24 NOTE — PROGRESS NOTES
Date: 06/24/24 Time: 7318    2nd call    Attempted to contact pt regarding subtherapeutic INR  Left message for pt wife to call us back to confirm warfarin dosing    Will attempt again later    Audra Lei, PatriciaD

## 2024-06-25 NOTE — PROGRESS NOTES
I attempted to call this patient to report their INR. Unfortunately, I was not able to speak with them so I LVM asking for them to call us back as their INR is near 1.

## 2024-06-26 ENCOUNTER — TELEPHONE (OUTPATIENT)
Dept: VASCULAR LAB | Facility: MEDICAL CENTER | Age: 84
End: 2024-06-26
Payer: MEDICARE

## 2024-06-26 NOTE — TELEPHONE ENCOUNTER
Caller: Breanne(pt's wife)    Topic/issue: Patient's wife was calling with questions about her husbands INR and she asked for a call back.    Callback Number: [phone]      Thank you    -Addison BRAN

## 2024-06-26 NOTE — PROGRESS NOTES
Anticoagulation Summary  As of 2024      INR goal:  2.0-3.0   TTR:  59.3% (2.1 y)   INR used for dosin.05 (2024)   Warfarin maintenance plan:  5 mg (5 mg x 1) every Tue, Thu; 2.5 mg (5 mg x 0.5) all other days   Weekly warfarin total:  22.5 mg   Plan last modified:  Patricia SueD (2023)   Next INR check:  2024   Target end date:  Indefinite    Indications    PAF (paroxysmal atrial fibrillation) (HCC) [I48.0]  Chronic anticoagulation (Resolved) [Z79.01]                 Anticoagulation Episode Summary       INR check location:  Anticoagulation Clinic    Preferred lab:      Send INR reminders to:      Comments:            Anticoagulation Care Providers       Provider Role Specialty Phone number    Lauri العلي M.D. Referring Cardiovascular Disease (Cardiology) [phone]    [facility] Responsible  [phone]            Refer to Anticoagulation Patient Findings for HPI  Patient Findings       Positives:  Missed doses (Pt missed 5 doses d/t eye surgery per pt wife)    Negatives:  Signs/symptoms of thrombosis, Signs/symptoms of bleeding, Laboratory test error suspected, Change in health, Change in alcohol use, Change in activity, Upcoming invasive procedure, Emergency department visit, Upcoming dental procedure, Extra doses, Change in medications, Change in diet/appetite, Hospital admission, Bruising, Other complaints            Spoke with pt wife.  INR is SUB therapeutic.     Pt verifies warfarin weekly dosing.     Will have pt continue current regimen for now given INR 5 days old.     Repeat INR tomorrow.    Rohith Goodman, PharmD, BCACP

## 2024-06-26 NOTE — PROGRESS NOTES
Date: 06/26/24 Time: 0924    3rd call    Attempted to contact pt regarding subtherapeutic INR  Left message for pt to call us back to discuss dosing    Sent letter    Audra Lie, PatriciaD

## 2024-07-23 DIAGNOSIS — Z79.01 CHRONIC ANTICOAGULATION: ICD-10-CM

## 2024-11-22 ENCOUNTER — HOSPITAL ENCOUNTER (EMERGENCY)
Facility: MEDICAL CENTER | Age: 84
End: 2024-11-22
Attending: EMERGENCY MEDICINE
Payer: MEDICARE

## 2024-11-22 ENCOUNTER — APPOINTMENT (OUTPATIENT)
Dept: RADIOLOGY | Facility: MEDICAL CENTER | Age: 84
End: 2024-11-22
Attending: EMERGENCY MEDICINE
Payer: MEDICARE

## 2024-11-22 VITALS
RESPIRATION RATE: 16 BRPM | BODY MASS INDEX: 29.25 KG/M2 | WEIGHT: 182 LBS | HEIGHT: 66 IN | TEMPERATURE: 98 F | SYSTOLIC BLOOD PRESSURE: 193 MMHG | DIASTOLIC BLOOD PRESSURE: 86 MMHG | HEART RATE: 55 BPM | OXYGEN SATURATION: 91 %
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
## 2024-11-22 DIAGNOSIS — S00.03XA CONTUSION OF SCALP, INITIAL ENCOUNTER: ICD-10-CM

## 2024-11-22 LAB — EKG IMPRESSION: NORMAL

## 2024-11-22 PROCEDURE — 70450 CT HEAD/BRAIN W/O DYE: CPT

## 2024-11-22 PROCEDURE — 72125 CT NECK SPINE W/O DYE: CPT

## 2024-11-22 PROCEDURE — 93005 ELECTROCARDIOGRAM TRACING: CPT | Performed by: EMERGENCY MEDICINE

## 2024-11-22 PROCEDURE — 99284 EMERGENCY DEPT VISIT MOD MDM: CPT

## 2024-11-22 PROCEDURE — 36415 COLL VENOUS BLD VENIPUNCTURE: CPT

## 2024-11-22 ASSESSMENT — FIBROSIS 4 INDEX: FIB4 SCORE: 1.28

## 2024-11-22 ASSESSMENT — PAIN DESCRIPTION - PAIN TYPE: TYPE: ACUTE PAIN

## 2024-11-23 NOTE — ED TRIAGE NOTES
Chief Complaint   Patient presents with    GLF     Pt was walking up bleachers when he tripped causing him to fall backward. +head strike, -LOC, +thinners.  Abrasion to posterior scalp.      Pt BIB EMS from [facility] for above complaint. Pt activated as TBI and evaluated by Dr. Gentile at the charge desk.    Pt transported to CT then [facility]. Report to Shayy GRAF

## 2024-11-23 NOTE — ED PROVIDER NOTES
ED Provider Note    CHIEF COMPLAINT  Chief Complaint   Patient presents with    GLF     Pt was walking up bleachers when he tripped causing him to fall backward. +head strike, -LOC, +thinners.  Abrasion to posterior scalp.        EXTERNAL RECORDS REVIEWED  Outpatient Notes outpatient office visit 2024 for continuation of care of paroxysmal A-fib.    HPI/ROS  LIMITATION TO HISTORY   Select: : None  OUTSIDE HISTORIAN(S):  EMS reported charge desk for code TBI    Alexis Randall is a 84 y.o. male who presents to the emerged from after fall while attempting to walk up bleachers at local [place].  On blood thinner secondary to A-fib.  Did not lose consciousness.  EMS note small abrasion to the scalp.  Mild neck discomfort.  No mid to low back discomfort.  No chest pain shortness breath palpitations.  No abdominal pain.  Unknown last tetanus.     PAST MEDICAL HISTORY   has a past medical history of Arthritis, Chronic anticoagulation, Depression, Diabetes ([facility]), Dyslipidemia, Essential hypertension, Hypertension, Macular degeneration, and PAF (paroxysmal atrial fibrillation) ([facility]).    SURGICAL HISTORY   has a past surgical history that includes laminotomy (); eye surgery; and arthroplasty.    FAMILY HISTORY  Family History   Problem Relation Age of Onset    Heart Disease Mother        SOCIAL HISTORY  Social History     Tobacco Use    Smoking status: Former     Current packs/day: 0.00     Types: Cigarettes     Start date: 1960     Quit date: 1984     Years since quittin.9    Smokeless tobacco: Former   Vaping Use    Vaping status: Never Used   Substance and Sexual Activity    Alcohol use: Yes     Comment: twice-three times a month    Drug use: No    Sexual activity: Not on file       CURRENT MEDICATIONS  Home Medications       Reviewed by Isabella Dorman R.N. (Registered Nurse) on 24 at   Med List Status: Not Addressed     Medication Last Dose Status   acetaminophen (TYLENOL) 325  "MG Tab  Active   albuterol 108 (90 Base) MCG/ACT Aero Soln inhalation aerosol  Active   amLODIPine (NORVASC) 5 MG Tab  Active   atorvastatin (LIPITOR) 20 MG Tab  Active   celecoxib (CELEBREX) 200 MG Cap  Active   donepezil (ARICEPT) 5 MG Tab  Active   escitalopram (LEXAPRO) 20 MG tablet  Active   finasteride (PROSCAR) 5 MG Tab  Active   guaiFENesin ER (MUCINEX) 600 MG TABLET SR 12 HR  Active   hydroCHLOROthiazide 12.5 MG tablet  Active   lisinopril (PRINIVIL) 40 MG tablet  Active   metFORMIN ER (GLUCOPHAGE XR) 750 MG TABLET SR 24 HR  Active   methylPREDNISolone (MEDROL) 4 MG Tab  Active   metoprolol tartrate (LOPRESSOR) 25 MG Tab  Active   Multiple Vitamins-Minerals (PRESERVISION AREDS 2) Cap  Active   traMADol (ULTRAM) 50 MG Tab  Active   warfarin (COUMADIN) 5 MG Tab  Active                    ALLERGIES  Allergies   Allergen Reactions    Remicade [Infliximab Injection] Anaphylaxis     Pt allergy to Remicade, takes pre treatment of Benadryl and SoluMedrol and is able to tolerate infusion.    Bee Venom Anaphylaxis     Carries epi pen       PHYSICAL EXAM  VITAL SIGNS: BP (!) 193/86   Pulse (!) 55   Temp 36.7 °C (98 °F) (Temporal)   Resp 16   Ht 1.676 m (5' 6\")   Wt 82.6 kg (182 lb)   SpO2 91%   BMI 29.38 kg/m²        Pulse ox interpretation: I interpret this pulse ox as normal.  Constitutional: Alert in no apparent distress.  HENT:  Bilateral external ears normal, Nose normal.  Small posterior parietal abrasion and contusion  Eyes: Pupils are equal and reactive,  Neck: Normal range of motion, No tenderness, Supple  Cardiovascular: Regular rate and rhythm, no murmurs.   Thorax & Lungs: Normal breath sounds, No respiratory distress, No wheezing, No chest tenderness.   Abdomen: Bowel sounds normal, Soft, No tenderness  Skin: Warm, Dry  Musculoskeletal: Good range of motion in all major joints. No tenderness to palpation or major deformities noted.   Neurologic: Alert , Normal motor function, Normal sensory " function, No focal deficits noted.   Psychiatric: Affect normal, Judgment normal, Mood normal.         RADIOLOGY/PROCEDURES   I have independently interpreted the diagnostic imaging associated with this visit and am waiting the final reading from the radiologist.   My preliminary interpretation is as follows: No large intracranial hemorrhage    Radiologist interpretation:  CT-CSPINE WITHOUT PLUS RECONS   Final Result         1. No acute fracture from C1 through T1 is visualized.         CT-HEAD W/O   Final Result         1. No acute intracranial findings. No evidence of acute intracranial hemorrhage or mass lesion.      2. White matter lucencies most consistent with chronic small vessel ischemic change.                               COURSE & MEDICAL DECISION MAKING    ASSESSMENT, COURSE AND PLAN  Care Narrative: 84-year-old presented emerged from after mechanical fall.  Code TBI upon arrival.  CT imaging will be completed of the head and neck.      DISPOSITION AND DISCUSSIONS  I have discussed management of the patient with the following physicians and RACHAEL's: None    Discussion of management with other QHP or appropriate source(s): None     Escalation of care considered, and ultimately not performed:acute inpatient care management, however at this time, the patient is most appropriate for outpatient management    Barriers to care at this time, including but not limited to:  None .     84-year-old presenting with above presentation.  Trauma workup benign.  Patient remains neurologically intact.  Will discharge to home with family.    FINAL DIAGNOSIS  1. Fall, initial encounter    2. Contusion of scalp, initial encounter         Electronically signed by: Edmund Gentile M.D., 11/22/2024 8:23 PM

## 2024-11-24 PROBLEM — N17.9 AKI (ACUTE KIDNEY INJURY) (HCC): Status: ACTIVE | Noted: 2024-11-24

## 2024-11-24 PROBLEM — K52.9 ENTERITIS: Status: ACTIVE | Noted: 2024-11-24
